# Patient Record
Sex: MALE | Race: BLACK OR AFRICAN AMERICAN | NOT HISPANIC OR LATINO | URBAN - METROPOLITAN AREA
[De-identification: names, ages, dates, MRNs, and addresses within clinical notes are randomized per-mention and may not be internally consistent; named-entity substitution may affect disease eponyms.]

---

## 2020-03-02 ENCOUNTER — INPATIENT (INPATIENT)
Facility: HOSPITAL | Age: 65
LOS: 7 days | Discharge: NURSING, REHAB OR LONG-TERM CARE | End: 2020-03-10
Payer: MEDICARE

## 2020-03-02 VITALS — OXYGEN SATURATION: 100 % | HEART RATE: 54 BPM

## 2020-03-02 DIAGNOSIS — E87.2 ACIDOSIS: ICD-10-CM

## 2020-03-02 LAB
ALBUMIN SERPL ELPH-MCNC: 3.4 G/DL — SIGNIFICANT CHANGE UP (ref 3.3–5)
ALBUMIN SERPL ELPH-MCNC: 3.6 G/DL — SIGNIFICANT CHANGE UP (ref 3.3–5)
ALP SERPL-CCNC: 137 U/L — HIGH (ref 40–120)
ALP SERPL-CCNC: 149 U/L — HIGH (ref 40–120)
ALT FLD-CCNC: 24 U/L — SIGNIFICANT CHANGE UP (ref 4–41)
ALT FLD-CCNC: 24 U/L — SIGNIFICANT CHANGE UP (ref 4–41)
AMMONIA BLD-MCNC: 28 UMOL/L — SIGNIFICANT CHANGE UP (ref 11–55)
ANION GAP SERPL CALC-SCNC: 13 MMO/L — SIGNIFICANT CHANGE UP (ref 7–14)
ANION GAP SERPL CALC-SCNC: 17 MMO/L — HIGH (ref 7–14)
ANION GAP SERPL CALC-SCNC: 22 MMO/L — HIGH (ref 7–14)
APTT BLD: 36.1 SEC — SIGNIFICANT CHANGE UP (ref 27.5–36.3)
AST SERPL-CCNC: 14 U/L — SIGNIFICANT CHANGE UP (ref 4–40)
AST SERPL-CCNC: 14 U/L — SIGNIFICANT CHANGE UP (ref 4–40)
B PERT DNA SPEC QL NAA+PROBE: NOT DETECTED — SIGNIFICANT CHANGE UP
BASE EXCESS BLDA CALC-SCNC: 0.5 MMOL/L — SIGNIFICANT CHANGE UP
BASE EXCESS BLDV CALC-SCNC: 5 MMOL/L — SIGNIFICANT CHANGE UP
BASE EXCESS BLDV CALC-SCNC: 5.7 MMOL/L — SIGNIFICANT CHANGE UP
BASOPHILS # BLD AUTO: 0.01 K/UL — SIGNIFICANT CHANGE UP (ref 0–0.2)
BASOPHILS # BLD AUTO: 0.01 K/UL — SIGNIFICANT CHANGE UP (ref 0–0.2)
BASOPHILS NFR BLD AUTO: 0.1 % — SIGNIFICANT CHANGE UP (ref 0–2)
BASOPHILS NFR BLD AUTO: 0.1 % — SIGNIFICANT CHANGE UP (ref 0–2)
BILIRUB SERPL-MCNC: 0.2 MG/DL — SIGNIFICANT CHANGE UP (ref 0.2–1.2)
BILIRUB SERPL-MCNC: 0.2 MG/DL — SIGNIFICANT CHANGE UP (ref 0.2–1.2)
BLOOD GAS VENOUS - CREATININE: 8.31 MG/DL — HIGH (ref 0.5–1.3)
BLOOD GAS VENOUS - CREATININE: 8.77 MG/DL — HIGH (ref 0.5–1.3)
BLOOD GAS VENOUS - FIO2: 21 — SIGNIFICANT CHANGE UP
BLOOD GAS VENOUS - FIO2: 21 — SIGNIFICANT CHANGE UP
BUN SERPL-MCNC: 46 MG/DL — HIGH (ref 7–23)
BUN SERPL-MCNC: 52 MG/DL — HIGH (ref 7–23)
BUN SERPL-MCNC: 54 MG/DL — HIGH (ref 7–23)
C PNEUM DNA SPEC QL NAA+PROBE: NOT DETECTED — SIGNIFICANT CHANGE UP
CALCIUM SERPL-MCNC: 8.1 MG/DL — LOW (ref 8.4–10.5)
CALCIUM SERPL-MCNC: 8.3 MG/DL — LOW (ref 8.4–10.5)
CALCIUM SERPL-MCNC: 8.5 MG/DL — SIGNIFICANT CHANGE UP (ref 8.4–10.5)
CHLORIDE BLDA-SCNC: 100 MMOL/L — SIGNIFICANT CHANGE UP (ref 96–108)
CHLORIDE BLDV-SCNC: 98 MMOL/L — SIGNIFICANT CHANGE UP (ref 96–108)
CHLORIDE BLDV-SCNC: 99 MMOL/L — SIGNIFICANT CHANGE UP (ref 96–108)
CHLORIDE SERPL-SCNC: 92 MMOL/L — LOW (ref 98–107)
CHLORIDE SERPL-SCNC: 95 MMOL/L — LOW (ref 98–107)
CHLORIDE SERPL-SCNC: 95 MMOL/L — LOW (ref 98–107)
CK MB BLD-MCNC: 2.84 NG/ML — SIGNIFICANT CHANGE UP (ref 1–6.6)
CK SERPL-CCNC: 65 U/L — SIGNIFICANT CHANGE UP (ref 30–200)
CO2 SERPL-SCNC: 18 MMOL/L — LOW (ref 22–31)
CO2 SERPL-SCNC: 23 MMOL/L — SIGNIFICANT CHANGE UP (ref 22–31)
CO2 SERPL-SCNC: 29 MMOL/L — SIGNIFICANT CHANGE UP (ref 22–31)
CREAT SERPL-MCNC: 8.28 MG/DL — HIGH (ref 0.5–1.3)
CREAT SERPL-MCNC: 8.39 MG/DL — HIGH (ref 0.5–1.3)
CREAT SERPL-MCNC: 8.66 MG/DL — HIGH (ref 0.5–1.3)
EOSINOPHIL # BLD AUTO: 0.02 K/UL — SIGNIFICANT CHANGE UP (ref 0–0.5)
EOSINOPHIL # BLD AUTO: 0.19 K/UL — SIGNIFICANT CHANGE UP (ref 0–0.5)
EOSINOPHIL NFR BLD AUTO: 0.2 % — SIGNIFICANT CHANGE UP (ref 0–6)
EOSINOPHIL NFR BLD AUTO: 2.8 % — SIGNIFICANT CHANGE UP (ref 0–6)
FLUAV H1 2009 PAND RNA SPEC QL NAA+PROBE: NOT DETECTED — SIGNIFICANT CHANGE UP
FLUAV H1 RNA SPEC QL NAA+PROBE: NOT DETECTED — SIGNIFICANT CHANGE UP
FLUAV H3 RNA SPEC QL NAA+PROBE: DETECTED — HIGH
FLUBV RNA SPEC QL NAA+PROBE: NOT DETECTED — SIGNIFICANT CHANGE UP
GAS PNL BLDV: 136 MMOL/L — SIGNIFICANT CHANGE UP (ref 136–146)
GAS PNL BLDV: 137 MMOL/L — SIGNIFICANT CHANGE UP (ref 136–146)
GLUCOSE BLDA-MCNC: 135 MG/DL — HIGH (ref 70–99)
GLUCOSE BLDC GLUCOMTR-MCNC: 126 MG/DL — HIGH (ref 70–99)
GLUCOSE BLDV-MCNC: 126 MG/DL — HIGH (ref 70–99)
GLUCOSE BLDV-MCNC: 78 MG/DL — SIGNIFICANT CHANGE UP (ref 70–99)
GLUCOSE SERPL-MCNC: 129 MG/DL — HIGH (ref 70–99)
GLUCOSE SERPL-MCNC: 135 MG/DL — HIGH (ref 70–99)
GLUCOSE SERPL-MCNC: 76 MG/DL — SIGNIFICANT CHANGE UP (ref 70–99)
HADV DNA SPEC QL NAA+PROBE: NOT DETECTED — SIGNIFICANT CHANGE UP
HCO3 BLDA-SCNC: 25 MMOL/L — SIGNIFICANT CHANGE UP (ref 22–26)
HCO3 BLDV-SCNC: 25 MMOL/L — SIGNIFICANT CHANGE UP (ref 20–27)
HCO3 BLDV-SCNC: 26 MMOL/L — SIGNIFICANT CHANGE UP (ref 20–27)
HCOV PNL SPEC NAA+PROBE: SIGNIFICANT CHANGE UP
HCT VFR BLD CALC: 41 % — SIGNIFICANT CHANGE UP (ref 39–50)
HCT VFR BLD CALC: 41.4 % — SIGNIFICANT CHANGE UP (ref 39–50)
HCT VFR BLDA CALC: 38.8 % — LOW (ref 39–51)
HCT VFR BLDV CALC: 40.9 % — SIGNIFICANT CHANGE UP (ref 39–51)
HCT VFR BLDV CALC: 44.4 % — SIGNIFICANT CHANGE UP (ref 39–51)
HGB BLD-MCNC: 12.7 G/DL — LOW (ref 13–17)
HGB BLD-MCNC: 12.7 G/DL — LOW (ref 13–17)
HGB BLDA-MCNC: 12.6 G/DL — LOW (ref 13–17)
HGB BLDV-MCNC: 13.3 G/DL — SIGNIFICANT CHANGE UP (ref 13–17)
HGB BLDV-MCNC: 14.5 G/DL — SIGNIFICANT CHANGE UP (ref 13–17)
HMPV RNA SPEC QL NAA+PROBE: NOT DETECTED — SIGNIFICANT CHANGE UP
HPIV1 RNA SPEC QL NAA+PROBE: NOT DETECTED — SIGNIFICANT CHANGE UP
HPIV2 RNA SPEC QL NAA+PROBE: NOT DETECTED — SIGNIFICANT CHANGE UP
HPIV3 RNA SPEC QL NAA+PROBE: NOT DETECTED — SIGNIFICANT CHANGE UP
HPIV4 RNA SPEC QL NAA+PROBE: NOT DETECTED — SIGNIFICANT CHANGE UP
IMM GRANULOCYTES NFR BLD AUTO: 0.3 % — SIGNIFICANT CHANGE UP (ref 0–1.5)
IMM GRANULOCYTES NFR BLD AUTO: 0.3 % — SIGNIFICANT CHANGE UP (ref 0–1.5)
INR BLD: 1.04 — SIGNIFICANT CHANGE UP (ref 0.88–1.17)
LACTATE BLDA-SCNC: 2.4 MMOL/L — HIGH (ref 0.5–2)
LACTATE BLDV-MCNC: 1.3 MMOL/L — SIGNIFICANT CHANGE UP (ref 0.5–2)
LACTATE BLDV-MCNC: 1.5 MMOL/L — SIGNIFICANT CHANGE UP (ref 0.5–2)
LYMPHOCYTES # BLD AUTO: 0.92 K/UL — LOW (ref 1–3.3)
LYMPHOCYTES # BLD AUTO: 1.5 K/UL — SIGNIFICANT CHANGE UP (ref 1–3.3)
LYMPHOCYTES # BLD AUTO: 10 % — LOW (ref 13–44)
LYMPHOCYTES # BLD AUTO: 22.1 % — SIGNIFICANT CHANGE UP (ref 13–44)
MAGNESIUM SERPL-MCNC: 1.8 MG/DL — SIGNIFICANT CHANGE UP (ref 1.6–2.6)
MCHC RBC-ENTMCNC: 29.6 PG — SIGNIFICANT CHANGE UP (ref 27–34)
MCHC RBC-ENTMCNC: 30.4 PG — SIGNIFICANT CHANGE UP (ref 27–34)
MCHC RBC-ENTMCNC: 30.7 % — LOW (ref 32–36)
MCHC RBC-ENTMCNC: 31 % — LOW (ref 32–36)
MCV RBC AUTO: 95.6 FL — SIGNIFICANT CHANGE UP (ref 80–100)
MCV RBC AUTO: 99 FL — SIGNIFICANT CHANGE UP (ref 80–100)
MONOCYTES # BLD AUTO: 0.27 K/UL — SIGNIFICANT CHANGE UP (ref 0–0.9)
MONOCYTES # BLD AUTO: 0.42 K/UL — SIGNIFICANT CHANGE UP (ref 0–0.9)
MONOCYTES NFR BLD AUTO: 2.9 % — SIGNIFICANT CHANGE UP (ref 2–14)
MONOCYTES NFR BLD AUTO: 6.2 % — SIGNIFICANT CHANGE UP (ref 2–14)
NEUTROPHILS # BLD AUTO: 4.64 K/UL — SIGNIFICANT CHANGE UP (ref 1.8–7.4)
NEUTROPHILS # BLD AUTO: 7.97 K/UL — HIGH (ref 1.8–7.4)
NEUTROPHILS NFR BLD AUTO: 68.5 % — SIGNIFICANT CHANGE UP (ref 43–77)
NEUTROPHILS NFR BLD AUTO: 86.5 % — HIGH (ref 43–77)
NRBC # FLD: 0 K/UL — SIGNIFICANT CHANGE UP (ref 0–0)
NRBC # FLD: 0 K/UL — SIGNIFICANT CHANGE UP (ref 0–0)
PCO2 BLDA: 42 MMHG — SIGNIFICANT CHANGE UP (ref 35–48)
PCO2 BLDV: 65 MMHG — HIGH (ref 41–51)
PCO2 BLDV: 90 MMHG — CRITICAL HIGH (ref 41–51)
PH BLDA: 7.39 PH — SIGNIFICANT CHANGE UP (ref 7.35–7.45)
PH BLDV: 7.19 PH — CRITICAL LOW (ref 7.32–7.43)
PH BLDV: 7.31 PH — LOW (ref 7.32–7.43)
PHOSPHATE SERPL-MCNC: 3.2 MG/DL — SIGNIFICANT CHANGE UP (ref 2.5–4.5)
PLATELET # BLD AUTO: 114 K/UL — LOW (ref 150–400)
PLATELET # BLD AUTO: 114 K/UL — LOW (ref 150–400)
PMV BLD: 10.2 FL — SIGNIFICANT CHANGE UP (ref 7–13)
PMV BLD: 10.8 FL — SIGNIFICANT CHANGE UP (ref 7–13)
PO2 BLDA: 121 MMHG — HIGH (ref 83–108)
PO2 BLDV: 44 MMHG — HIGH (ref 35–40)
PO2 BLDV: < 24 MMHG — LOW (ref 35–40)
POTASSIUM BLDA-SCNC: 6.4 MMOL/L — CRITICAL HIGH (ref 3.4–4.5)
POTASSIUM BLDV-SCNC: 4.5 MMOL/L — SIGNIFICANT CHANGE UP (ref 3.4–4.5)
POTASSIUM BLDV-SCNC: 5.1 MMOL/L — HIGH (ref 3.4–4.5)
POTASSIUM SERPL-MCNC: 4.8 MMOL/L — SIGNIFICANT CHANGE UP (ref 3.5–5.3)
POTASSIUM SERPL-MCNC: 5.4 MMOL/L — HIGH (ref 3.5–5.3)
POTASSIUM SERPL-MCNC: 6.4 MMOL/L — CRITICAL HIGH (ref 3.5–5.3)
POTASSIUM SERPL-SCNC: 4.8 MMOL/L — SIGNIFICANT CHANGE UP (ref 3.5–5.3)
POTASSIUM SERPL-SCNC: 5.4 MMOL/L — HIGH (ref 3.5–5.3)
POTASSIUM SERPL-SCNC: 6.4 MMOL/L — CRITICAL HIGH (ref 3.5–5.3)
PROT SERPL-MCNC: 7 G/DL — SIGNIFICANT CHANGE UP (ref 6–8.3)
PROT SERPL-MCNC: 7.6 G/DL — SIGNIFICANT CHANGE UP (ref 6–8.3)
PROTHROM AB SERPL-ACNC: 11.9 SEC — SIGNIFICANT CHANGE UP (ref 9.8–13.1)
RBC # BLD: 4.18 M/UL — LOW (ref 4.2–5.8)
RBC # BLD: 4.29 M/UL — SIGNIFICANT CHANGE UP (ref 4.2–5.8)
RBC # FLD: 15.5 % — HIGH (ref 10.3–14.5)
RBC # FLD: 15.6 % — HIGH (ref 10.3–14.5)
RSV RNA SPEC QL NAA+PROBE: NOT DETECTED — SIGNIFICANT CHANGE UP
RV+EV RNA SPEC QL NAA+PROBE: NOT DETECTED — SIGNIFICANT CHANGE UP
SAO2 % BLDA: 96.8 % — SIGNIFICANT CHANGE UP (ref 95–99)
SAO2 % BLDV: 28.7 % — LOW (ref 60–85)
SAO2 % BLDV: 65.3 % — SIGNIFICANT CHANGE UP (ref 60–85)
SODIUM BLDA-SCNC: 131 MMOL/L — LOW (ref 136–146)
SODIUM SERPL-SCNC: 132 MMOL/L — LOW (ref 135–145)
SODIUM SERPL-SCNC: 135 MMOL/L — SIGNIFICANT CHANGE UP (ref 135–145)
SODIUM SERPL-SCNC: 137 MMOL/L — SIGNIFICANT CHANGE UP (ref 135–145)
TROPONIN T, HIGH SENSITIVITY: 71 NG/L — CRITICAL HIGH (ref ?–14)
TROPONIN T, HIGH SENSITIVITY: 78 NG/L — CRITICAL HIGH (ref ?–14)
TSH SERPL-MCNC: 1.51 UIU/ML — SIGNIFICANT CHANGE UP (ref 0.27–4.2)
WBC # BLD: 6.78 K/UL — SIGNIFICANT CHANGE UP (ref 3.8–10.5)
WBC # BLD: 9.22 K/UL — SIGNIFICANT CHANGE UP (ref 3.8–10.5)
WBC # FLD AUTO: 6.78 K/UL — SIGNIFICANT CHANGE UP (ref 3.8–10.5)
WBC # FLD AUTO: 9.22 K/UL — SIGNIFICANT CHANGE UP (ref 3.8–10.5)

## 2020-03-02 PROCEDURE — 70450 CT HEAD/BRAIN W/O DYE: CPT | Mod: 26

## 2020-03-02 PROCEDURE — 71045 X-RAY EXAM CHEST 1 VIEW: CPT | Mod: 26,77,76

## 2020-03-02 PROCEDURE — 99291 CRITICAL CARE FIRST HOUR: CPT

## 2020-03-02 PROCEDURE — 71045 X-RAY EXAM CHEST 1 VIEW: CPT | Mod: 26

## 2020-03-02 RX ORDER — DEXTROSE 50 % IN WATER 50 %
25 SYRINGE (ML) INTRAVENOUS ONCE
Refills: 0 | Status: DISCONTINUED | OUTPATIENT
Start: 2020-03-02 | End: 2020-03-03

## 2020-03-02 RX ORDER — CHLORHEXIDINE GLUCONATE 213 G/1000ML
15 SOLUTION TOPICAL EVERY 12 HOURS
Refills: 0 | Status: DISCONTINUED | OUTPATIENT
Start: 2020-03-02 | End: 2020-03-03

## 2020-03-02 RX ORDER — DEXTROSE 50 % IN WATER 50 %
50 SYRINGE (ML) INTRAVENOUS ONCE
Refills: 0 | Status: COMPLETED | OUTPATIENT
Start: 2020-03-02 | End: 2020-03-02

## 2020-03-02 RX ORDER — ROCURONIUM BROMIDE 10 MG/ML
100 VIAL (ML) INTRAVENOUS ONCE
Refills: 0 | Status: COMPLETED | OUTPATIENT
Start: 2020-03-02 | End: 2020-03-02

## 2020-03-02 RX ORDER — NICARDIPINE HYDROCHLORIDE 30 MG/1
5 CAPSULE, EXTENDED RELEASE ORAL
Qty: 40 | Refills: 0 | Status: DISCONTINUED | OUTPATIENT
Start: 2020-03-02 | End: 2020-03-02

## 2020-03-02 RX ORDER — PROPOFOL 10 MG/ML
10 INJECTION, EMULSION INTRAVENOUS
Qty: 500 | Refills: 0 | Status: DISCONTINUED | OUTPATIENT
Start: 2020-03-02 | End: 2020-03-02

## 2020-03-02 RX ORDER — PROPOFOL 10 MG/ML
15 INJECTION, EMULSION INTRAVENOUS
Qty: 1000 | Refills: 0 | Status: DISCONTINUED | OUTPATIENT
Start: 2020-03-02 | End: 2020-03-03

## 2020-03-02 RX ORDER — NALOXONE HYDROCHLORIDE 4 MG/.1ML
2 SPRAY NASAL ONCE
Refills: 0 | Status: COMPLETED | OUTPATIENT
Start: 2020-03-02 | End: 2020-03-02

## 2020-03-02 RX ORDER — SODIUM CHLORIDE 9 MG/ML
1000 INJECTION, SOLUTION INTRAVENOUS
Refills: 0 | Status: DISCONTINUED | OUTPATIENT
Start: 2020-03-02 | End: 2020-03-03

## 2020-03-02 RX ORDER — INSULIN HUMAN 100 [IU]/ML
5 INJECTION, SOLUTION SUBCUTANEOUS ONCE
Refills: 0 | Status: COMPLETED | OUTPATIENT
Start: 2020-03-02 | End: 2020-03-02

## 2020-03-02 RX ORDER — FENTANYL CITRATE 50 UG/ML
50 INJECTION INTRAVENOUS ONCE
Refills: 0 | Status: DISCONTINUED | OUTPATIENT
Start: 2020-03-02 | End: 2020-03-02

## 2020-03-02 RX ORDER — CHLORHEXIDINE GLUCONATE 213 G/1000ML
1 SOLUTION TOPICAL
Refills: 0 | Status: DISCONTINUED | OUTPATIENT
Start: 2020-03-02 | End: 2020-03-10

## 2020-03-02 RX ORDER — INSULIN LISPRO 100/ML
VIAL (ML) SUBCUTANEOUS EVERY 6 HOURS
Refills: 0 | Status: DISCONTINUED | OUTPATIENT
Start: 2020-03-02 | End: 2020-03-05

## 2020-03-02 RX ORDER — GLUCAGON INJECTION, SOLUTION 0.5 MG/.1ML
1 INJECTION, SOLUTION SUBCUTANEOUS ONCE
Refills: 0 | Status: DISCONTINUED | OUTPATIENT
Start: 2020-03-02 | End: 2020-03-03

## 2020-03-02 RX ORDER — FENTANYL CITRATE 50 UG/ML
0.5 INJECTION INTRAVENOUS
Qty: 2500 | Refills: 0 | Status: DISCONTINUED | OUTPATIENT
Start: 2020-03-02 | End: 2020-03-03

## 2020-03-02 RX ORDER — DEXTROSE 50 % IN WATER 50 %
12.5 SYRINGE (ML) INTRAVENOUS ONCE
Refills: 0 | Status: DISCONTINUED | OUTPATIENT
Start: 2020-03-02 | End: 2020-03-03

## 2020-03-02 RX ORDER — ETOMIDATE 2 MG/ML
20 INJECTION INTRAVENOUS ONCE
Refills: 0 | Status: COMPLETED | OUTPATIENT
Start: 2020-03-02 | End: 2020-03-02

## 2020-03-02 RX ORDER — DEXTROSE 50 % IN WATER 50 %
15 SYRINGE (ML) INTRAVENOUS ONCE
Refills: 0 | Status: DISCONTINUED | OUTPATIENT
Start: 2020-03-02 | End: 2020-03-03

## 2020-03-02 RX ADMIN — INSULIN HUMAN 5 UNIT(S): 100 INJECTION, SOLUTION SUBCUTANEOUS at 23:54

## 2020-03-02 RX ADMIN — ETOMIDATE 20 MILLIGRAM(S): 2 INJECTION INTRAVENOUS at 13:55

## 2020-03-02 RX ADMIN — Medication 50 MILLILITER(S): at 23:53

## 2020-03-02 RX ADMIN — CHLORHEXIDINE GLUCONATE 15 MILLILITER(S): 213 SOLUTION TOPICAL at 20:29

## 2020-03-02 RX ADMIN — NALOXONE HYDROCHLORIDE 2 MILLIGRAM(S): 4 SPRAY NASAL at 13:55

## 2020-03-02 RX ADMIN — Medication 30 MILLIGRAM(S): at 23:54

## 2020-03-02 RX ADMIN — FENTANYL CITRATE 50 MICROGRAM(S): 50 INJECTION INTRAVENOUS at 17:00

## 2020-03-02 RX ADMIN — Medication 100 MILLIGRAM(S): at 13:55

## 2020-03-02 NOTE — H&P ADULT - ATTENDING COMMENTS
Pt examined and case reviewed in detail on bedside rounds  Critically ill on vent with unexplained coma temporally associated with minor vascular procedure For head scan  Frequent bedside visits with therapy change today  Crit Care Time Today 35 min+

## 2020-03-02 NOTE — H&P ADULT - NSHPPHYSICALEXAM_GEN_ALL_CORE
GENERAL: NAD, pharmacologically sedated  HEENT: NCAT, pupils 2mm and equal, oral mucosa moist, normal conjunctiva  RESP: faint diffuse expiratory wheeze, no respiratory distress, mechanically ventilated  CV: RRR, no murmurs/rubs/gallops, midline well-healed surgical scar  ABDOMEN: soft, non-tender, non-distended, no guarding, midline well-healed surgical scar below umbilicus   MSK: no visible deformities  NEURO: sedated, RASS -2  SKIN: warm, normal color, well perfused, no rash  PSYCH: sedated

## 2020-03-02 NOTE — ED ADULT NURSE REASSESSMENT NOTE - NS ED NURSE REASSESS COMMENT FT1
Pt received at CT Scan and transported back to Trauma A. VS as documented. No Cardene Gtt at this time. Propofol to stay as per Attending. Pt rectally hypotensive. Warming blanket placed back on. Awaiting bed assignment in MICU. will continue to monitor closely.

## 2020-03-02 NOTE — ED PROVIDER NOTE - CARE PLAN
Principal Discharge DX:	Respiratory acidosis Principal Discharge DX:	Respiratory acidosis  Secondary Diagnosis:	AMS (altered mental status)

## 2020-03-02 NOTE — H&P ADULT - NSHPREVIEWOFSYSTEMS_GEN_ALL_CORE
GENERAL:  EYES:  HEENT:  CARDIAC:  PULMONARY:  GI:   :  SKIN:  NEURO:  MSK:    [ X ] Unable to obtain due to pharmacologic sedation

## 2020-03-02 NOTE — ED PROVIDER NOTE - ATTENDING CONTRIBUTION TO CARE
64 year old male with pmh cabg esrd htn dm presents with ams from outpatient surg center while having fistual eval'd. rec'd 2 mg bzd and 50 mcg fentanyl and became unresponsive.  then rec'd narcan flumazeminal, sol cortef, epi with no relief. nico to ED> with etc02 greater than 75.  attempted to bag and repeat narcan with no improvement.  intubated for airway protection and hypercarbia. keesha consult concern for likley oversedation, hypercarbic resp failure, pna, ich, will check head ct, labs, admit to micu

## 2020-03-02 NOTE — ED PROVIDER NOTE - CLINICAL SUMMARY MEDICAL DECISION MAKING FREE TEXT BOX
63yo M ESRD, HTN DM BIBEMS unresponsive after receiving meds for outside vascular procedure, intubated for airway protection, multiple attempts to ventilate pt with ambu bag, also gave 2mg narcan IV with no response, need labs trop ekg CT head. MICU consulted.

## 2020-03-02 NOTE — ED PROVIDER NOTE - PROGRESS NOTE DETAILS
CTH w/o intracranial bleed. MICU accepting admit.  Morgan Solorzano MD, PGY3 Emergency Medicine Daughter Shireen Lisbeth [phone]

## 2020-03-02 NOTE — ED ADULT TRIAGE NOTE - CHIEF COMPLAINT QUOTE
pt arriving from dental office as notification for respiratory distress. pt received 15mcg of fentanyl. pt taken directly to tra. pt scheduled for LAV fistula procedure for decreased thrill., arriving from Vascular center  as notification for respiratory distress. pt received 50mcg of fentanyl and 2mg of versed. pt taken directly to tra.

## 2020-03-02 NOTE — H&P ADULT - ASSESSMENT
64 yom PMH HTN, DM, ESRD on dialysis, seizure disorder, presents with altered mental status, likely 2/2 hypercarbic respiratory failure.  Patient administered Narcan, Flumazenil, and Dextrose IM by office staff without improvement. Intubated in ED and sent to MICU for further care.  #Neuro: seizure disorder  -Propofol gtt - wean as tolerated  -AEDs - review home medications  #CV: HTN  -Monitor vitals  -IV anti-hypertensives PRN  -Resume home PO meds once extubated  #Resp: intubated  -Wean vent as tolerated  #GI: no active issues  -NPO  #Endocrine: DM  -ISS once extubated  #Renal: ESRD  -Dialysis as necessary  -Monitor potassium on BMP  -F/u EKG  #ID: no active issues  #Heme:   -Heparin SQ  #Dispo:  -Pending 64 yom PMH HTN, DM, ESRD on dialysis, seizure disorder, presents with altered mental status, likely 2/2 hypercarbic respiratory failure.  Patient administered Narcan, Flumazenil, and Dextrose IM by office staff without improvement. Intubated in ED and sent to MICU for further care.  #Neuro: seizure disorder  -Propofol gtt - wean as tolerated  -AEDs - review home medications  #CV: HTN  -Monitor vitals  -IV anti-hypertensives PRN  -Resume home PO meds once extubated  #Resp: intubated  -Wean vent as tolerated  #GI: no active issues  -NPO  #Endocrine: DM  -ISS once extubated  #Renal: ESRD  -Dialysis as necessary (T/Th/Sat)  -Monitor potassium on BMP  -F/u EKG  #ID: no active issues  #Heme:   -Heparin SQ  #Dispo:  -Pending

## 2020-03-02 NOTE — ED PROVIDER NOTE - PMH
Benign essential HTN    CAD (coronary atherosclerotic disease)    Cataract    Chronic kidney disease (CKD)    CVA (cerebrovascular accident)    DM (diabetes mellitus)    ESRD (end stage renal disease) on dialysis    High cholesterol    Seizure disorder    Stented coronary artery

## 2020-03-02 NOTE — ED ADULT NURSE NOTE - CHIEF COMPLAINT QUOTE
pt scheduled for LAV fistula procedure for decreased thrill., arriving from Vascular center  as notification for respiratory distress. pt received 50mcg of fentanyl and 2mg of versed. pt taken directly to tra.

## 2020-03-02 NOTE — ED ADULT NURSE NOTE - OBJECTIVE STATEMENT
64M hx HTN DM ESRD on dialysis seizure disorder brought in from outpatient vascular office, was having a procedure for AV fistula, received 50 mcg fentanyl, 2mg versed, became minimally responsive and bradypneic. Received Narcan, flumazenil solucortef, bicarb, epi, benadryl, and dextrose IM with no response.  Upon arrival patient obtunded in the ED, responsive to pain, intubated to protect airway. NSR, Breath sounds bilaterally with rhonchi. Skin dry and intact.

## 2020-03-02 NOTE — H&P ADULT - HISTORY OF PRESENT ILLNESS
64 yom PMH HTN, DM, ESRD on dialysis, seizure disorder, presents to the ED by ambulance from an outpatient vascular office. Patient was having a procedure performed on his left forearm AV fistula. He received 50 mcg Fentanyl, 2 mg Versed, and subsequently became unresponsive and bradypneic. Patient administered Narcan, Flumazenil, and Dextrose IM by office staff without improvement.  In ED: patient given additional 2 mg Narcan without improvement. Patient subsequently intubated for airway protection and concern of hypercarbic respiratory failure. Vitals notable for temp of 93.4*F. CT head performed showing XXXXX. CXR with XXXXX. Patient transferred to ICU for continued care. 64 yom PMH HTN, DM, ESRD on dialysis, seizure disorder, presents to the ED by ambulance from an outpatient vascular office. Patient was having a procedure performed on his left forearm AV fistula. He received 50 mcg Fentanyl, 2 mg Versed, and subsequently became unresponsive and bradypneic. Patient administered Narcan, Flumazenil, and Dextrose IM by office staff without improvement.  In ED: patient given additional 2 mg Narcan without improvement. Patient subsequently intubated for airway protection and concern of hypercarbic respiratory failure. Vitals notable for temp of 93.4*F. CT head and CXR performed with results pending. Patient transferred to ICU for continued care.

## 2020-03-02 NOTE — H&P ADULT - NSHPLABSRESULTS_GEN_ALL_CORE
12.7   6.78  )-----------( 114      ( 02 Mar 2020 12:50 )             41.4     03-02    137  |  95<L>  |  46<H>  ----------------------------<  76  4.8   |  29  |  8.28<H>    Ca    8.1<L>      02 Mar 2020 12:50    TPro  7.6  /  Alb  3.6  /  TBili  0.2  /  DBili  x   /  AST  14  /  ALT  24  /  AlkPhos  149<H>  03-02    LIVER FUNCTIONS - ( 02 Mar 2020 12:50 )  Alb: 3.6 g/dL / Pro: 7.6 g/dL / ALK PHOS: 149 u/L / ALT: 24 u/L / AST: 14 u/L / GGT: x           PT/INR - ( 02 Mar 2020 12:50 )   PT: 11.9 SEC;   INR: 1.04        PTT - ( 02 Mar 2020 12:50 )  PTT:36.1 SEC    EKG:     RADIOLOGY STUDIES:    < from: Xray Chest 1 View AP/PA (03.02.20 @ 13:38) >    EXAM:  XR CHEST AP OR PA 1V      PROCEDURE DATE:  Mar  2 2020     INTERPRETATION:  CLINICAL INDICATION: intubated    EXAM:  Single frontal chest from 3/2/2020 at 1338. Compared to prior study from 12/20/2012.    IMPRESSION:  Intubated with ETT tip at clavicular level.    Small right pleural reaction. Streaky opacities in adjacent right lower lung could be compatible with subsegmental atelectatic change or scarring. Sharp left CP angle. Grossly clear remaining visualized lungs. No pneumothorax.    Stable cardiomegaly.    Currently present metallic mesh vascular stent in left axillary region.    < end of copied text >    < from: CT Head No Cont (03.02.20 @ 15:26) >    EXAM:  CT BRAIN      PROCEDURE DATE:  Mar  2 2020     INTERPRETATION:  CLINICAL INFORMATION: Altered consciousness.    TECHNIQUE: Noncontrast axial CT images were acquired through the head. Two-dimensional sagittal and coronal reformats were generated.    COMPARISON STUDY: None available.    FINDINGS:     There is no acute intracranial hemorrhage, extra-axial collection, vasogenic edema, mass effect, midline shift, central herniation, or hydrocephalus.     There is moderate cerebral volume loss and patchy white matter hypoattenuation, likely related to chronic microvascular changes. Encephalomalacia and gliosis in the right temporoparietal region likely sequela of old infarct is again noted.    Small air-fluid level seen in the right maxillary sinus. Minimal mucosal thickening is seen involving the right ethmoid sinus. The mastoid air cells and middle ear cavities are clear. Phthisis bulbi of the right globe. Status post left cataract lens surgery.    The soft tissues of the scalpare unremarkable. The calvarium is intact.    IMPRESSION:     No acute intracranial hemorrhage, mass effect or midline shift. No displaced calvarial fracture. Old right posterior temporal infarct.      < end of copied text >

## 2020-03-02 NOTE — ED ADULT NURSE NOTE - NSIMPLEMENTINTERV_GEN_ALL_ED
Implemented All Fall Risk Interventions:  [place] to call system. Call bell, personal items and telephone within reach. Instruct patient to call for assistance. Room bathroom lighting operational. Non-slip footwear when patient is off stretcher. Physically safe environment: no spills, clutter or unnecessary equipment. Stretcher in lowest position, wheels locked, appropriate side rails in place. Provide visual cue, wrist band, yellow gown, etc. Monitor gait and stability. Monitor for mental status changes and reorient to person, place, and time. Review medications for side effects contributing to fall risk. Reinforce activity limits and safety measures with patient and family.

## 2020-03-03 DIAGNOSIS — N18.6 END STAGE RENAL DISEASE: ICD-10-CM

## 2020-03-03 LAB
ALBUMIN SERPL ELPH-MCNC: 3 G/DL — LOW (ref 3.3–5)
ALP SERPL-CCNC: 130 U/L — HIGH (ref 40–120)
ALT FLD-CCNC: 23 U/L — SIGNIFICANT CHANGE UP (ref 4–41)
ANION GAP SERPL CALC-SCNC: 20 MMO/L — HIGH (ref 7–14)
APTT BLD: 32.6 SEC — SIGNIFICANT CHANGE UP (ref 27.5–36.3)
AST SERPL-CCNC: 13 U/L — SIGNIFICANT CHANGE UP (ref 4–40)
BASOPHILS # BLD AUTO: 0.02 K/UL — SIGNIFICANT CHANGE UP (ref 0–0.2)
BASOPHILS NFR BLD AUTO: 0.2 % — SIGNIFICANT CHANGE UP (ref 0–2)
BILIRUB SERPL-MCNC: 0.3 MG/DL — SIGNIFICANT CHANGE UP (ref 0.2–1.2)
BUN SERPL-MCNC: 56 MG/DL — HIGH (ref 7–23)
CALCIUM SERPL-MCNC: 8.4 MG/DL — SIGNIFICANT CHANGE UP (ref 8.4–10.5)
CHLORIDE SERPL-SCNC: 93 MMOL/L — LOW (ref 98–107)
CO2 SERPL-SCNC: 23 MMOL/L — SIGNIFICANT CHANGE UP (ref 22–31)
CREAT SERPL-MCNC: 9.23 MG/DL — HIGH (ref 0.5–1.3)
EOSINOPHIL # BLD AUTO: 0.05 K/UL — SIGNIFICANT CHANGE UP (ref 0–0.5)
EOSINOPHIL NFR BLD AUTO: 0.6 % — SIGNIFICANT CHANGE UP (ref 0–6)
GLUCOSE BLDC GLUCOMTR-MCNC: 110 MG/DL — HIGH (ref 70–99)
GLUCOSE BLDC GLUCOMTR-MCNC: 171 MG/DL — HIGH (ref 70–99)
GLUCOSE BLDC GLUCOMTR-MCNC: 183 MG/DL — HIGH (ref 70–99)
GLUCOSE BLDC GLUCOMTR-MCNC: 225 MG/DL — HIGH (ref 70–99)
GLUCOSE BLDC GLUCOMTR-MCNC: 52 MG/DL — LOW (ref 70–99)
GLUCOSE BLDC GLUCOMTR-MCNC: 71 MG/DL — SIGNIFICANT CHANGE UP (ref 70–99)
GLUCOSE BLDC GLUCOMTR-MCNC: 84 MG/DL — SIGNIFICANT CHANGE UP (ref 70–99)
GLUCOSE BLDC GLUCOMTR-MCNC: 99 MG/DL — SIGNIFICANT CHANGE UP (ref 70–99)
GLUCOSE SERPL-MCNC: 80 MG/DL — SIGNIFICANT CHANGE UP (ref 70–99)
HBA1C BLD-MCNC: 7.5 % — HIGH (ref 4–5.6)
HBV SURFACE AG SER-ACNC: NEGATIVE — SIGNIFICANT CHANGE UP
HCT VFR BLD CALC: 36.7 % — LOW (ref 39–50)
HCV AB S/CO SERPL IA: 0.15 S/CO — SIGNIFICANT CHANGE UP (ref 0–0.99)
HCV AB SERPL-IMP: SIGNIFICANT CHANGE UP
HGB BLD-MCNC: 12 G/DL — LOW (ref 13–17)
IMM GRANULOCYTES NFR BLD AUTO: 0.2 % — SIGNIFICANT CHANGE UP (ref 0–1.5)
LYMPHOCYTES # BLD AUTO: 1.58 K/UL — SIGNIFICANT CHANGE UP (ref 1–3.3)
LYMPHOCYTES # BLD AUTO: 19.3 % — SIGNIFICANT CHANGE UP (ref 13–44)
MAGNESIUM SERPL-MCNC: 1.7 MG/DL — SIGNIFICANT CHANGE UP (ref 1.6–2.6)
MCHC RBC-ENTMCNC: 30.3 PG — SIGNIFICANT CHANGE UP (ref 27–34)
MCHC RBC-ENTMCNC: 32.7 % — SIGNIFICANT CHANGE UP (ref 32–36)
MCV RBC AUTO: 92.7 FL — SIGNIFICANT CHANGE UP (ref 80–100)
MONOCYTES # BLD AUTO: 0.54 K/UL — SIGNIFICANT CHANGE UP (ref 0–0.9)
MONOCYTES NFR BLD AUTO: 6.6 % — SIGNIFICANT CHANGE UP (ref 2–14)
NEUTROPHILS # BLD AUTO: 5.96 K/UL — SIGNIFICANT CHANGE UP (ref 1.8–7.4)
NEUTROPHILS NFR BLD AUTO: 73.1 % — SIGNIFICANT CHANGE UP (ref 43–77)
NRBC # FLD: 0 K/UL — SIGNIFICANT CHANGE UP (ref 0–0)
PHOSPHATE SERPL-MCNC: 2 MG/DL — LOW (ref 2.5–4.5)
PLATELET # BLD AUTO: 109 K/UL — LOW (ref 150–400)
PMV BLD: 10.9 FL — SIGNIFICANT CHANGE UP (ref 7–13)
POTASSIUM SERPL-MCNC: 4.8 MMOL/L — SIGNIFICANT CHANGE UP (ref 3.5–5.3)
POTASSIUM SERPL-SCNC: 4.8 MMOL/L — SIGNIFICANT CHANGE UP (ref 3.5–5.3)
PROT SERPL-MCNC: 7 G/DL — SIGNIFICANT CHANGE UP (ref 6–8.3)
RBC # BLD: 3.96 M/UL — LOW (ref 4.2–5.8)
RBC # FLD: 15.4 % — HIGH (ref 10.3–14.5)
SODIUM SERPL-SCNC: 136 MMOL/L — SIGNIFICANT CHANGE UP (ref 135–145)
SPECIMEN SOURCE: SIGNIFICANT CHANGE UP
WBC # BLD: 8.17 K/UL — SIGNIFICANT CHANGE UP (ref 3.8–10.5)
WBC # FLD AUTO: 8.17 K/UL — SIGNIFICANT CHANGE UP (ref 3.8–10.5)

## 2020-03-03 PROCEDURE — 99291 CRITICAL CARE FIRST HOUR: CPT

## 2020-03-03 RX ORDER — ASPIRIN/CALCIUM CARB/MAGNESIUM 324 MG
81 TABLET ORAL DAILY
Refills: 0 | Status: DISCONTINUED | OUTPATIENT
Start: 2020-03-03 | End: 2020-03-04

## 2020-03-03 RX ORDER — CLOPIDOGREL BISULFATE 75 MG/1
75 TABLET, FILM COATED ORAL DAILY
Refills: 0 | Status: DISCONTINUED | OUTPATIENT
Start: 2020-03-03 | End: 2020-03-04

## 2020-03-03 RX ORDER — HEPARIN SODIUM 5000 [USP'U]/ML
5000 INJECTION INTRAVENOUS; SUBCUTANEOUS EVERY 8 HOURS
Refills: 0 | Status: DISCONTINUED | OUTPATIENT
Start: 2020-03-03 | End: 2020-03-10

## 2020-03-03 RX ORDER — SODIUM CHLORIDE 9 MG/ML
1000 INJECTION, SOLUTION INTRAVENOUS
Refills: 0 | Status: DISCONTINUED | OUTPATIENT
Start: 2020-03-03 | End: 2020-03-05

## 2020-03-03 RX ORDER — NOREPINEPHRINE BITARTRATE/D5W 8 MG/250ML
0.05 PLASTIC BAG, INJECTION (ML) INTRAVENOUS
Qty: 8 | Refills: 0 | Status: DISCONTINUED | OUTPATIENT
Start: 2020-03-03 | End: 2020-03-04

## 2020-03-03 RX ORDER — DEXTROSE 50 % IN WATER 50 %
25 SYRINGE (ML) INTRAVENOUS ONCE
Refills: 0 | Status: COMPLETED | OUTPATIENT
Start: 2020-03-03 | End: 2020-03-03

## 2020-03-03 RX ORDER — ACETAMINOPHEN 500 MG
650 TABLET ORAL ONCE
Refills: 0 | Status: COMPLETED | OUTPATIENT
Start: 2020-03-03 | End: 2020-03-03

## 2020-03-03 RX ORDER — PREGABALIN 225 MG/1
1000 CAPSULE ORAL DAILY
Refills: 0 | Status: DISCONTINUED | OUTPATIENT
Start: 2020-03-03 | End: 2020-03-04

## 2020-03-03 RX ORDER — NOREPINEPHRINE BITARTRATE/D5W 8 MG/250ML
0.05 PLASTIC BAG, INJECTION (ML) INTRAVENOUS
Qty: 8 | Refills: 0 | Status: DISCONTINUED | OUTPATIENT
Start: 2020-03-03 | End: 2020-03-03

## 2020-03-03 RX ORDER — PANTOPRAZOLE SODIUM 20 MG/1
40 TABLET, DELAYED RELEASE ORAL DAILY
Refills: 0 | Status: DISCONTINUED | OUTPATIENT
Start: 2020-03-03 | End: 2020-03-06

## 2020-03-03 RX ORDER — HEPARIN SODIUM 5000 [USP'U]/ML
5000 INJECTION INTRAVENOUS; SUBCUTANEOUS EVERY 12 HOURS
Refills: 0 | Status: DISCONTINUED | OUTPATIENT
Start: 2020-03-03 | End: 2020-03-03

## 2020-03-03 RX ORDER — FOLIC ACID 0.8 MG
1 TABLET ORAL DAILY
Refills: 0 | Status: DISCONTINUED | OUTPATIENT
Start: 2020-03-03 | End: 2020-03-04

## 2020-03-03 RX ORDER — DEXMEDETOMIDINE HYDROCHLORIDE IN 0.9% SODIUM CHLORIDE 4 UG/ML
0.5 INJECTION INTRAVENOUS
Qty: 200 | Refills: 0 | Status: DISCONTINUED | OUTPATIENT
Start: 2020-03-03 | End: 2020-03-03

## 2020-03-03 RX ORDER — FENTANYL CITRATE 50 UG/ML
50 INJECTION INTRAVENOUS ONCE
Refills: 0 | Status: DISCONTINUED | OUTPATIENT
Start: 2020-03-03 | End: 2020-03-03

## 2020-03-03 RX ADMIN — Medication 9.88 MICROGRAM(S)/KG/MIN: at 19:00

## 2020-03-03 RX ADMIN — PROPOFOL 9.49 MICROGRAM(S)/KG/MIN: 10 INJECTION, EMULSION INTRAVENOUS at 08:46

## 2020-03-03 RX ADMIN — SODIUM CHLORIDE 50 MILLILITER(S): 9 INJECTION, SOLUTION INTRAVENOUS at 19:00

## 2020-03-03 RX ADMIN — CHLORHEXIDINE GLUCONATE 15 MILLILITER(S): 213 SOLUTION TOPICAL at 17:37

## 2020-03-03 RX ADMIN — FENTANYL CITRATE 5.27 MICROGRAM(S)/KG/HR: 50 INJECTION INTRAVENOUS at 08:46

## 2020-03-03 RX ADMIN — Medication 650 MILLIGRAM(S): at 08:46

## 2020-03-03 RX ADMIN — CHLORHEXIDINE GLUCONATE 1 APPLICATION(S): 213 SOLUTION TOPICAL at 06:02

## 2020-03-03 RX ADMIN — CHLORHEXIDINE GLUCONATE 15 MILLILITER(S): 213 SOLUTION TOPICAL at 07:01

## 2020-03-03 RX ADMIN — HEPARIN SODIUM 5000 UNIT(S): 5000 INJECTION INTRAVENOUS; SUBCUTANEOUS at 22:06

## 2020-03-03 RX ADMIN — Medication 650 MILLIGRAM(S): at 10:00

## 2020-03-03 RX ADMIN — HEPARIN SODIUM 5000 UNIT(S): 5000 INJECTION INTRAVENOUS; SUBCUTANEOUS at 14:44

## 2020-03-03 RX ADMIN — Medication 30 MILLIGRAM(S): at 12:07

## 2020-03-03 RX ADMIN — SODIUM CHLORIDE 50 MILLILITER(S): 9 INJECTION, SOLUTION INTRAVENOUS at 12:06

## 2020-03-03 RX ADMIN — Medication 25 GRAM(S): at 11:40

## 2020-03-03 RX ADMIN — FENTANYL CITRATE 50 MICROGRAM(S): 50 INJECTION INTRAVENOUS at 18:01

## 2020-03-03 RX ADMIN — PANTOPRAZOLE SODIUM 40 MILLIGRAM(S): 20 TABLET, DELAYED RELEASE ORAL at 12:07

## 2020-03-03 NOTE — CONSULT NOTE ADULT - PROBLEM SELECTOR RECOMMENDATION 9
Maintenance HD schedule TTS.  Notable thrill on AV access, will schedule for HD treatment today.  Low BP noted, low UF goals planned.  HD consent obtained from pts nephew Bjorn Moore.  Electrolytes within normal limits.  Vent and pressor management as per ICU team.

## 2020-03-03 NOTE — CONSULT NOTE ADULT - SUBJECTIVE AND OBJECTIVE BOX
[facility] Consult Note Nephrology - CONSULTATION NOTE - [phone] - Dr Crockett / Dr Pa / Dr Garcia / Dr Sapp / Dr Hernadez / Dr Howell / Dr Harper / Dr Carey    Patient is a 64y Male whom presented to the hospital with     PAST MEDICAL & SURGICAL HISTORY:  Cataract  Stented coronary artery  ESRD (end stage renal disease) on dialysis  Seizure disorder  Chronic kidney disease (CKD)  CAD (coronary atherosclerotic disease)  CVA (cerebrovascular accident)  High cholesterol  Benign essential HTN  DM (diabetes mellitus)  AVF (arteriovenous fistula): left arm  S/P primary angioplasty with coronary stent  S/P CABG    Allergies:  No Known Allergies    Home Medications Reviewed  Hospital Medications:   MEDICATIONS  (STANDING):  chlorhexidine 0.12% Liquid 15 milliLiter(s) Oral Mucosa every 12 hours  chlorhexidine 4% Liquid 1 Application(s) Topical <User Schedule>  dexMEDEtomidine Infusion 0.5 MICROgram(s)/kG/Hr (13.175 mL/Hr) IV Continuous <Continuous>  dextrose 5% + lactated ringers. 1000 milliLiter(s) (50 mL/Hr) IV Continuous <Continuous>  heparin  Injectable 5000 Unit(s) SubCutaneous every 8 hours  insulin lispro (HumaLOG) corrective regimen sliding scale   SubCutaneous every 6 hours  norepinephrine Infusion 0.05 MICROgram(s)/kG/Min (9.881 mL/Hr) IV Continuous <Continuous>  oseltamivir Suspension 30 milliGRAM(s) Oral daily  pantoprazole  Injectable 40 milliGRAM(s) IV Push daily    SOCIAL HISTORY:  Denies ETOh,Smoking,   FAMILY HISTORY:    REVIEW OF SYSTEMS:  CONSTITUTIONAL: No weakness, fevers or chills  EYES/ENT: No visual changes;  No vertigo or throat pain   NECK: No pain or stiffness  RESPIRATORY: No cough, wheezing, hemoptysis; No shortness of breath  CARDIOVASCULAR: No chest pain or palpitations.  GASTROINTESTINAL: No abdominal or epigastric pain. No nausea, vomiting, or hematemesis; No diarrhea or constipation. No melena or hematochezia.  GENITOURINARY: No dysuria, frequency, foamy urine, urinary urgency, incontinence or hematuria  NEUROLOGICAL: No numbness or weakness  SKIN: No itching, burning, rashes, or lesions   VASCULAR: No bilateral lower extremity edema.   All other review of systems is negative unless indicated above.    VITALS:  T(F): 99.4 (03-03-20 @ 12:00), Max: 100.8 (03-03-20 @ 08:00)  HR: 66 (03-03-20 @ 12:10)  BP: 131/45 (03-03-20 @ 12:10)  RR: 14 (03-03-20 @ 12:10)  SpO2: 100% (03-03-20 @ 12:10)  Wt(kg): --    03-02 @ 07:01  -  03-03 @ 07:00  --------------------------------------------------------  IN: 426.3 mL / OUT: 0 mL / NET: 426.3 mL    03-03 @ 07:01  -  03-03 @ 12:33  --------------------------------------------------------  IN: 128.5 mL / OUT: 0 mL / NET: 128.5 mL      Height (cm): 180.3 (03-02 @ 18:04)  Weight (kg): 105.4 (03-02 @ 18:04)  BMI (kg/m2): 32.4 (03-02 @ 18:04)  BSA (m2): 2.25 (03-02 @ 18:04)  PHYSICAL EXAM:  Constitutional: NAD  HEENT: ETT  Neck: No JVD  Respiratory: CTAB, no wheezes, rales or rhonchi  Cardiovascular: S1, S2, RRR  Gastrointestinal: BS+, soft, NT/ND  Extremities: No cyanosis or clubbing. No peripheral edema  Neurological: sedated  : No CVA tenderness. No de luna.   Skin: No rashes  Vascular Access: LUE AV access +thrill and bruit.     LABS:  03-03    136  |  93<L>  |  56<H>  ----------------------------<  80  4.8   |  23  |  9.23<H>    Ca    8.4      03 Mar 2020 05:18  Phos  2.0     03-03  Mg     1.7     03-03    TPro  7.0  /  Alb  3.0<L>  /  TBili  0.3  /  DBili      /  AST  13  /  ALT  23  /  AlkPhos  130<H>  03-03    Creatinine Trend: 9.23 <--, 8.66 <--, 8.39 <--, 8.28 <--                        12.0   8.17  )-----------( 109      ( 03 Mar 2020 05:18 )             36.7     Urine Studies:      RADIOLOGY & ADDITIONAL STUDIES: [facility] Consult Note Nephrology - CONSULTATION NOTE - [phone] - Dr Crockett / Dr Pa / Dr Garcia / Dr Sapp / Dr Hernadez / Dr Howell / Dr Harper / Dr Carey    Patient is a 64y Male NH resident with HTN, DM, CAD s/p CABG ESRD on dialysis TTS, seizure disorder, BIBEMS from outpatient vascular office. Patient was having angioplasty of on his left forearm AV fistula. He received 50 mcg Fentanyl, 2 mg Versed, and subsequently became unresponsive and bradypneic. Patient administered Narcan, Flumazenil, and Dextrose IM by office staff without improvement. In ED: patient given additional 2 mg Narcan without improvement. Patient subsequently intubated for airway protection and concern of hypercarbic respiratory failure. Vitals notable for temp of 93.4*F. Pt remains intubated at this time. On pressor support. Sedated.     PAST MEDICAL & SURGICAL HISTORY:  Cataract  Stented coronary artery  ESRD (end stage renal disease) on dialysis  Seizure disorder  Chronic kidney disease (CKD)  CAD (coronary atherosclerotic disease)  CVA (cerebrovascular accident)  High cholesterol  Benign essential HTN  DM (diabetes mellitus)  AVF (arteriovenous fistula): left arm  S/P primary angioplasty with coronary stent  S/P CABG    Allergies:  No Known Allergies    Home Medications Reviewed  Hospital Medications:   MEDICATIONS  (STANDING):  chlorhexidine 0.12% Liquid 15 milliLiter(s) Oral Mucosa every 12 hours  chlorhexidine 4% Liquid 1 Application(s) Topical <User Schedule>  dexMEDEtomidine Infusion 0.5 MICROgram(s)/kG/Hr (13.175 mL/Hr) IV Continuous <Continuous>  dextrose 5% + lactated ringers. 1000 milliLiter(s) (50 mL/Hr) IV Continuous <Continuous>  heparin  Injectable 5000 Unit(s) SubCutaneous every 8 hours  insulin lispro (HumaLOG) corrective regimen sliding scale   SubCutaneous every 6 hours  norepinephrine Infusion 0.05 MICROgram(s)/kG/Min (9.881 mL/Hr) IV Continuous <Continuous>  oseltamivir Suspension 30 milliGRAM(s) Oral daily  pantoprazole  Injectable 40 milliGRAM(s) IV Push daily    SOCIAL HISTORY:  Denies ETOh,Smoking,   FAMILY HISTORY:    REVIEW OF SYSTEMS:  unable to obtain due to sedation     VITALS:  T(F): 99.4 (03-03-20 @ 12:00), Max: 100.8 (03-03-20 @ 08:00)  HR: 66 (03-03-20 @ 12:10)  BP: 131/45 (03-03-20 @ 12:10)  RR: 14 (03-03-20 @ 12:10)  SpO2: 100% (03-03-20 @ 12:10)  Wt(kg): --    03-02 @ 07:01  -  03-03 @ 07:00  --------------------------------------------------------  IN: 426.3 mL / OUT: 0 mL / NET: 426.3 mL    03-03 @ 07:01  -  03-03 @ 12:33  --------------------------------------------------------  IN: 128.5 mL / OUT: 0 mL / NET: 128.5 mL      Height (cm): 180.3 (03-02 @ 18:04)  Weight (kg): 105.4 (03-02 @ 18:04)  BMI (kg/m2): 32.4 (03-02 @ 18:04)  BSA (m2): 2.25 (03-02 @ 18:04)  PHYSICAL EXAM:  Constitutional: NAD  HEENT: ETT  Neck: No JVD  Respiratory: CTAB, no wheezes, rales or rhonchi  Cardiovascular: S1, S2, RRR  Gastrointestinal: BS+, soft, NT/ND  Extremities: No cyanosis or clubbing. No peripheral edema  Neurological: sedated  : No CVA tenderness. No de luna.   Skin: No rashes  Vascular Access: LUE AV access +thrill and bruit.     LABS:  03-03    136  |  93<L>  |  56<H>  ----------------------------<  80  4.8   |  23  |  9.23<H>    Ca    8.4      03 Mar 2020 05:18  Phos  2.0     03-03  Mg     1.7     03-03    TPro  7.0  /  Alb  3.0<L>  /  TBili  0.3  /  DBili      /  AST  13  /  ALT  23  /  AlkPhos  130<H>  03-03    Creatinine Trend: 9.23 <--, 8.66 <--, 8.39 <--, 8.28 <--                        12.0   8.17  )-----------( 109      ( 03 Mar 2020 05:18 )             36.7     Urine Studies:      RADIOLOGY & ADDITIONAL STUDIES:  < from: Xray Chest 1 View- PORTABLE-Urgent (03.02.20 @ 22:56) >  IMPRESSION:    1. Endotracheal and enteric tubes in position on most recent exam.  2. Stable cardiomegaly with loculated right effusion.    < end of copied text >    < from: CT Head No Cont (03.02.20 @ 15:26) >    IMPRESSION:     No acute intracranial hemorrhage, mass effect or midline shift. No displaced calvarial fracture. Old right posterior temporal infarct.    < end of copied text >

## 2020-03-03 NOTE — PROVIDER CONTACT NOTE (OTHER) - RECOMMENDATIONS
Team made aware 25g of dextrose to be administered as ordered. Will recheck FS as per hypoglycemia protocol.

## 2020-03-03 NOTE — CONSULT NOTE ADULT - ASSESSMENT
64y Male NH resident with HTN, DM, CAD s/p CABG ESRD on dialysis TTS, seizure disorder, BIBEMS from outpatient vascular office, for unresponsiveness after angioplasty.

## 2020-03-03 NOTE — CHART NOTE - NSCHARTNOTEFT_GEN_A_CORE
: hSaye Newman MD    INDICATION:    PROCEDURE:  [x] LIMITED ECHO  [x] LIMITED CHEST  [ ] LIMITED RETROPERITONEAL  [ ] LIMITED ABDOMINAL  [ ] LIMITED DVT  [ ] NEEDLE GUIDANCE VASCULAR  [ ] NEEDLE GUIDANCE THORACENTESIS  [ ] NEEDLE GUIDANCE PARACENTESIS  [ ] NEEDLE GUIDANCE PERICARDIOCENTESIS  [ ] OTHER    FINDINGS:  LUNGS: A-line predominant b/l  ECHO: poor cardiac windows    INTERPRETATION:  Normal aeration of lungs  Difficult to visualize cardiac structures due to poor windows.     Images stored on [facility].    Shaye Newman MD  PGY-1  Internal Medicine Prelim : Shaye Newman MD with Rodo HOOD at bedside    INDICATION:    PROCEDURE:  [x] LIMITED ECHO  [x] LIMITED CHEST  [ ] LIMITED RETROPERITONEAL  [ ] LIMITED ABDOMINAL  [ ] LIMITED DVT  [ ] NEEDLE GUIDANCE VASCULAR  [ ] NEEDLE GUIDANCE THORACENTESIS  [ ] NEEDLE GUIDANCE PARACENTESIS  [ ] NEEDLE GUIDANCE PERICARDIOCENTESIS  [ ] OTHER    FINDINGS:  LUNGS: A-line predominant b/l  ECHO: poor cardiac windows    INTERPRETATION:  Normal aeration of lungs  Difficult to visualize cardiac structures due to poor windows.     Images stored on [employer].    Shaye Newman MD  PGY-1  Internal Medicine Prelim

## 2020-03-03 NOTE — CONSULT NOTE ADULT - ATTENDING COMMENTS
Dr Odilon Crockett  Nephrology  Office [phone]  Ans Serv [phone]  [facility], [place] [phone]

## 2020-03-03 NOTE — PROGRESS NOTE ADULT - ASSESSMENT
64 yom PMH HTN, DM, ESRD on dialysis, seizure disorder, presents with altered mental status, likely 2/2 hypercarbic respiratory failure.  Patient administered Narcan, Flumazenil, and Dextrose IM by office staff without improvement. Intubated in ED and sent to MICU for further care.  #Neuro  - intubated on sedation, plan to wean down.   #CV: HTN  -Monitor vitals  -plan to renew home PO medications when patient is off pressors (coreg 25 mg BID- pt still is on levophed now)  #Resp: wean down FiO2 and sedation in effort to extubate.     #GI: no active issues  -plan to renew diet once extubated  #Endocrine: DM  -ISS once extubated  #Renal: ESRD  -Dialysis as necessary (T/Th/Sat), HD planned for today  -Monitor potassium on BMP  #ID: no active issues  #Heme:   -Heparin SQ  #Dispo:  -Pending

## 2020-03-03 NOTE — PROGRESS NOTE ADULT - SUBJECTIVE AND OBJECTIVE BOX
CHIEF COMPLAINT:    Interval Events: Pt remains intubated on sedation. OGT placed but tube feeds not started at this time.     REVIEW OF SYSTEMS:  Constitutional: [ ] negative [ ] fevers [ ] chills [ ] weight loss [ ] weight gain  HEENT: [ ] negative [ ] dry eyes [ ] eye irritation [ ] postnasal drip [ ] nasal congestion  CV: [ ] negative  [ ] chest pain [ ] orthopnea [ ] palpitations [ ] murmur  Resp: [ ] negative [ ] cough [ ] shortness of breath [ ] dyspnea [ ] wheezing [ ] sputum [ ] hemoptysis  GI: [ ] negative [ ] nausea [ ] vomiting [ ] diarrhea [ ] constipation [ ] abd pain [ ] dysphagia   : [ ] negative [ ] dysuria [ ] nocturia [ ] hematuria [ ] increased urinary frequency  Musculoskeletal: [ ] negative [ ] back pain [ ] myalgias [ ] arthralgias [ ] fracture  Skin: [ ] negative [ ] rash [ ] itch  Neurological: [ ] negative [ ] headache [ ] dizziness [ ] syncope [ ] weakness [ ] numbness  Psychiatric: [ ] negative [ ] anxiety [ ] depression  Endocrine: [ ] negative [ ] diabetes [ ] thyroid problem  Hematologic/Lymphatic: [ ] negative [ ] anemia [ ] bleeding problem  Allergic/Immunologic: [ ] negative [ ] itchy eyes [ ] nasal discharge [ ] hives [ ] angioedema  [ ] All other systems negative  [ ] Unable to assess ROS because ________    OBJECTIVE:  ICU Vital Signs Last 24 Hrs  T(C): 38.2 (03 Mar 2020 08:00), Max: 38.2 (03 Mar 2020 08:00)  T(F): 100.8 (03 Mar 2020 08:00), Max: 100.8 (03 Mar 2020 08:00)  HR: 63 (03 Mar 2020 08:00) (52 - 67)  BP: 108/40 (03 Mar 2020 08:00) (62/42 - 199/74)  BP(mean): 60 (03 Mar 2020 08:00) (42 - 97)  ABP: --  ABP(mean): --  RR: 14 (03 Mar 2020 08:00) (14 - 28)  SpO2: 100% (03 Mar 2020 08:00) (96% - 100%)    Mode: AC/ CMV (Assist Control/ Continuous Mandatory Ventilation), RR (machine): 14, TV (machine): 500, FiO2: 50, PEEP: 5, PS: 5, ITime: 1, MAP: 10, PIP: 28    03-02 @ 07:01  -  03-03 @ 07:00  --------------------------------------------------------  IN: 426.3 mL / OUT: 0 mL / NET: 426.3 mL      CAPILLARY BLOOD GLUCOSE      POCT Blood Glucose.: 84 mg/dL (03 Mar 2020 05:26)      PHYSICAL EXAM:  General:   HEENT:   Lymph Nodes:  Neck:   Respiratory:   Cardiovascular:   Abdomen:   Extremities:   Skin:   Neurological:  Psychiatry:    LINES:    HOSPITAL MEDICATIONS:    oseltamivir Suspension 30 milliGRAM(s) Oral daily    norepinephrine Infusion 0.05 MICROgram(s)/kG/Min IV Continuous <Continuous>    insulin lispro (HumaLOG) corrective regimen sliding scale   SubCutaneous every 6 hours      acetaminophen   Tablet .. 650 milliGRAM(s) Oral once  fentaNYL   Infusion. 0.5 MICROgram(s)/kG/Hr IV Continuous <Continuous>  propofol Infusion 15 MICROgram(s)/kG/Min IV Continuous <Continuous>              chlorhexidine 0.12% Liquid 15 milliLiter(s) Oral Mucosa every 12 hours  chlorhexidine 4% Liquid 1 Application(s) Topical <User Schedule>        LABS:                        12.0   8.17  )-----------( 109      ( 03 Mar 2020 05:18 )             36.7     Hgb Trend: 12.0<--, 12.7<--, 12.7<--  03-03    136  |  93<L>  |  56<H>  ----------------------------<  80  4.8   |  23  |  9.23<H>    Ca    8.4      03 Mar 2020 05:18  Phos  2.0     03-03  Mg     1.7     03-03    TPro  7.0  /  Alb  3.0<L>  /  TBili  0.3  /  DBili  x   /  AST  13  /  ALT  23  /  AlkPhos  130<H>  03-03    Creatinine Trend: 9.23<--, 8.66<--, 8.39<--, 8.28<--  PT/INR - ( 02 Mar 2020 12:50 )   PT: 11.9 SEC;   INR: 1.04          PTT - ( 03 Mar 2020 05:18 )  PTT:32.6 SEC    Arterial Blood Gas:  03-02 @ 19:00  7.39/42/121/25/96.8/0.5  ABG lactate: 2.4    Venous Blood Gas:  03-02 @ 15:49  7.31/65/< 24/26/28.7  VBG Lactate: 1.5  Venous Blood Gas:  03-02 @ 12:50  7.19/90/44/25/65.3  VBG Lactate: 1.3      MICROBIOLOGY:     RADIOLOGY:  [ ] Reviewed and interpreted by me    EKG:

## 2020-03-04 LAB
ALBUMIN SERPL ELPH-MCNC: 3.5 G/DL — SIGNIFICANT CHANGE UP (ref 3.3–5)
ALP SERPL-CCNC: 144 U/L — HIGH (ref 40–120)
ALT FLD-CCNC: 19 U/L — SIGNIFICANT CHANGE UP (ref 4–41)
ANION GAP SERPL CALC-SCNC: 17 MMO/L — HIGH (ref 7–14)
AST SERPL-CCNC: 19 U/L — SIGNIFICANT CHANGE UP (ref 4–40)
BASOPHILS # BLD AUTO: 0.04 K/UL — SIGNIFICANT CHANGE UP (ref 0–0.2)
BASOPHILS NFR BLD AUTO: 0.2 % — SIGNIFICANT CHANGE UP (ref 0–2)
BILIRUB SERPL-MCNC: 0.4 MG/DL — SIGNIFICANT CHANGE UP (ref 0.2–1.2)
BUN SERPL-MCNC: 34 MG/DL — HIGH (ref 7–23)
CALCIUM SERPL-MCNC: 8.7 MG/DL — SIGNIFICANT CHANGE UP (ref 8.4–10.5)
CHLORIDE SERPL-SCNC: 91 MMOL/L — LOW (ref 98–107)
CO2 SERPL-SCNC: 24 MMOL/L — SIGNIFICANT CHANGE UP (ref 22–31)
CREAT SERPL-MCNC: 7.37 MG/DL — HIGH (ref 0.5–1.3)
EOSINOPHIL # BLD AUTO: 0.16 K/UL — SIGNIFICANT CHANGE UP (ref 0–0.5)
EOSINOPHIL NFR BLD AUTO: 0.8 % — SIGNIFICANT CHANGE UP (ref 0–6)
GLUCOSE BLDC GLUCOMTR-MCNC: 143 MG/DL — HIGH (ref 70–99)
GLUCOSE BLDC GLUCOMTR-MCNC: 152 MG/DL — HIGH (ref 70–99)
GLUCOSE BLDC GLUCOMTR-MCNC: 159 MG/DL — HIGH (ref 70–99)
GLUCOSE BLDC GLUCOMTR-MCNC: 190 MG/DL — HIGH (ref 70–99)
GLUCOSE SERPL-MCNC: 201 MG/DL — HIGH (ref 70–99)
GRAM STN FLD: SIGNIFICANT CHANGE UP
HBV CORE AB SER-ACNC: REACTIVE — SIGNIFICANT CHANGE UP
HBV SURFACE AB SER-ACNC: 176.4 MLU/ML — SIGNIFICANT CHANGE UP
HCT VFR BLD CALC: 41.7 % — SIGNIFICANT CHANGE UP (ref 39–50)
HGB BLD-MCNC: 13.2 G/DL — SIGNIFICANT CHANGE UP (ref 13–17)
IMM GRANULOCYTES NFR BLD AUTO: 0.6 % — SIGNIFICANT CHANGE UP (ref 0–1.5)
LYMPHOCYTES # BLD AUTO: 1.3 K/UL — SIGNIFICANT CHANGE UP (ref 1–3.3)
LYMPHOCYTES # BLD AUTO: 6.9 % — LOW (ref 13–44)
MAGNESIUM SERPL-MCNC: 1.8 MG/DL — SIGNIFICANT CHANGE UP (ref 1.6–2.6)
MCHC RBC-ENTMCNC: 30.5 PG — SIGNIFICANT CHANGE UP (ref 27–34)
MCHC RBC-ENTMCNC: 31.7 % — LOW (ref 32–36)
MCV RBC AUTO: 96.3 FL — SIGNIFICANT CHANGE UP (ref 80–100)
MONOCYTES # BLD AUTO: 1.06 K/UL — HIGH (ref 0–0.9)
MONOCYTES NFR BLD AUTO: 5.6 % — SIGNIFICANT CHANGE UP (ref 2–14)
NEUTROPHILS # BLD AUTO: 16.17 K/UL — HIGH (ref 1.8–7.4)
NEUTROPHILS NFR BLD AUTO: 85.9 % — HIGH (ref 43–77)
NRBC # FLD: 0 K/UL — SIGNIFICANT CHANGE UP (ref 0–0)
PHOSPHATE SERPL-MCNC: 4 MG/DL — SIGNIFICANT CHANGE UP (ref 2.5–4.5)
PLATELET # BLD AUTO: 118 K/UL — LOW (ref 150–400)
PMV BLD: 10.7 FL — SIGNIFICANT CHANGE UP (ref 7–13)
POTASSIUM SERPL-MCNC: 5 MMOL/L — SIGNIFICANT CHANGE UP (ref 3.5–5.3)
POTASSIUM SERPL-SCNC: 5 MMOL/L — SIGNIFICANT CHANGE UP (ref 3.5–5.3)
PROT SERPL-MCNC: 8.1 G/DL — SIGNIFICANT CHANGE UP (ref 6–8.3)
RBC # BLD: 4.33 M/UL — SIGNIFICANT CHANGE UP (ref 4.2–5.8)
RBC # FLD: 15.6 % — HIGH (ref 10.3–14.5)
SODIUM SERPL-SCNC: 132 MMOL/L — LOW (ref 135–145)
SPECIMEN SOURCE: SIGNIFICANT CHANGE UP
WBC # BLD: 18.85 K/UL — HIGH (ref 3.8–10.5)
WBC # FLD AUTO: 18.85 K/UL — HIGH (ref 3.8–10.5)

## 2020-03-04 PROCEDURE — 76604 US EXAM CHEST: CPT | Mod: 26

## 2020-03-04 PROCEDURE — 93308 TTE F-UP OR LMTD: CPT | Mod: 26

## 2020-03-04 PROCEDURE — 99291 CRITICAL CARE FIRST HOUR: CPT | Mod: 25

## 2020-03-04 PROCEDURE — 71045 X-RAY EXAM CHEST 1 VIEW: CPT | Mod: 26

## 2020-03-04 RX ORDER — GABAPENTIN 400 MG/1
300 CAPSULE ORAL THREE TIMES A DAY
Refills: 0 | Status: DISCONTINUED | OUTPATIENT
Start: 2020-03-04 | End: 2020-03-04

## 2020-03-04 RX ORDER — PIPERACILLIN AND TAZOBACTAM 4; .5 G/20ML; G/20ML
3.38 INJECTION, POWDER, LYOPHILIZED, FOR SOLUTION INTRAVENOUS EVERY 12 HOURS
Refills: 0 | Status: DISCONTINUED | OUTPATIENT
Start: 2020-03-05 | End: 2020-03-06

## 2020-03-04 RX ORDER — GABAPENTIN 400 MG/1
300 CAPSULE ORAL DAILY
Refills: 0 | Status: DISCONTINUED | OUTPATIENT
Start: 2020-03-04 | End: 2020-03-04

## 2020-03-04 RX ORDER — AZITHROMYCIN 500 MG/1
500 TABLET, FILM COATED ORAL ONCE
Refills: 0 | Status: COMPLETED | OUTPATIENT
Start: 2020-03-04 | End: 2020-03-04

## 2020-03-04 RX ORDER — PIPERACILLIN AND TAZOBACTAM 4; .5 G/20ML; G/20ML
3.38 INJECTION, POWDER, LYOPHILIZED, FOR SOLUTION INTRAVENOUS ONCE
Refills: 0 | Status: COMPLETED | OUTPATIENT
Start: 2020-03-04 | End: 2020-03-04

## 2020-03-04 RX ORDER — AZITHROMYCIN 500 MG/1
TABLET, FILM COATED ORAL
Refills: 0 | Status: DISCONTINUED | OUTPATIENT
Start: 2020-03-04 | End: 2020-03-04

## 2020-03-04 RX ORDER — CEFTRIAXONE 500 MG/1
1000 INJECTION, POWDER, FOR SOLUTION INTRAMUSCULAR; INTRAVENOUS ONCE
Refills: 0 | Status: DISCONTINUED | OUTPATIENT
Start: 2020-03-04 | End: 2020-03-04

## 2020-03-04 RX ORDER — CEFTRIAXONE 500 MG/1
INJECTION, POWDER, FOR SOLUTION INTRAMUSCULAR; INTRAVENOUS
Refills: 0 | Status: DISCONTINUED | OUTPATIENT
Start: 2020-03-04 | End: 2020-03-04

## 2020-03-04 RX ORDER — IPRATROPIUM/ALBUTEROL SULFATE 18-103MCG
3 AEROSOL WITH ADAPTER (GRAM) INHALATION ONCE
Refills: 0 | Status: COMPLETED | OUTPATIENT
Start: 2020-03-04 | End: 2020-03-04

## 2020-03-04 RX ADMIN — Medication 1: at 17:34

## 2020-03-04 RX ADMIN — HEPARIN SODIUM 5000 UNIT(S): 5000 INJECTION INTRAVENOUS; SUBCUTANEOUS at 15:05

## 2020-03-04 RX ADMIN — Medication 1: at 05:08

## 2020-03-04 RX ADMIN — HEPARIN SODIUM 5000 UNIT(S): 5000 INJECTION INTRAVENOUS; SUBCUTANEOUS at 05:07

## 2020-03-04 RX ADMIN — Medication 2: at 00:00

## 2020-03-04 RX ADMIN — Medication 20 MILLIGRAM(S): at 21:13

## 2020-03-04 RX ADMIN — PIPERACILLIN AND TAZOBACTAM 200 GRAM(S): 4; .5 INJECTION, POWDER, LYOPHILIZED, FOR SOLUTION INTRAVENOUS at 15:05

## 2020-03-04 RX ADMIN — Medication 1: at 23:31

## 2020-03-04 RX ADMIN — AZITHROMYCIN 255 MILLIGRAM(S): 500 TABLET, FILM COATED ORAL at 12:51

## 2020-03-04 RX ADMIN — PANTOPRAZOLE SODIUM 40 MILLIGRAM(S): 20 TABLET, DELAYED RELEASE ORAL at 15:05

## 2020-03-04 RX ADMIN — Medication 3 MILLILITER(S): at 20:42

## 2020-03-04 RX ADMIN — HEPARIN SODIUM 5000 UNIT(S): 5000 INJECTION INTRAVENOUS; SUBCUTANEOUS at 21:13

## 2020-03-04 NOTE — PROGRESS NOTE ADULT - SUBJECTIVE AND OBJECTIVE BOX
Nephrology Followup Note - [phone] - Dr Crockett / Dr Pa / Dr Garcia / Dr Sapp / Dr Hernadez / Dr Howell / Dr Harper / Dr Carey  Pt seen and examined at bedside  Pt extubated. Awake but remains confused AAOx1.     Allergies:  No Known Allergies    Hospital Medications:   MEDICATIONS  (STANDING):  aspirin  chewable 81 milliGRAM(s) Oral daily  chlorhexidine 4% Liquid 1 Application(s) Topical <User Schedule>  clopidogrel Tablet 75 milliGRAM(s) Oral daily  cyanocobalamin 1000 MICROGram(s) Oral daily  dextrose 5% + lactated ringers. 1000 milliLiter(s) (50 mL/Hr) IV Continuous <Continuous>  folic acid 1 milliGRAM(s) Oral daily  gabapentin 300 milliGRAM(s) Oral daily  heparin  Injectable 5000 Unit(s) SubCutaneous every 8 hours  insulin lispro (HumaLOG) corrective regimen sliding scale   SubCutaneous every 6 hours  norepinephrine Infusion 0.05 MICROgram(s)/kG/Min (9.881 mL/Hr) IV Continuous <Continuous>  oseltamivir Suspension 30 milliGRAM(s) Oral daily  pantoprazole  Injectable 40 milliGRAM(s) IV Push daily    VITALS:  T(F): 97.5 (03-04-20 @ 08:00), Max: 99.4 (03-03-20 @ 12:00)  HR: 75 (03-04-20 @ 09:00)  BP: 121/45 (03-04-20 @ 09:00)  RR: 13 (03-04-20 @ 09:00)  SpO2: 99% (03-04-20 @ 09:00)  Wt(kg): --    03-03 @ 07:01  -  03-04 @ 07:00  --------------------------------------------------------  IN: 2118.9 mL / OUT: 1200 mL / NET: 918.9 mL        PHYSICAL EXAM:  Constitutional: NAD  HEENT: anicteric sclera, oropharynx clear, MMM  Neck: No JVD  Respiratory: CTAB, no wheezes, rales or rhonchi  Cardiovascular: S1, S2, RRR  Gastrointestinal: BS+, soft, NT/ND  Extremities: No cyanosis or clubbing. No peripheral edema  Neurological: A/O x 1  : No CVA tenderness. No de luna.   Skin: No rashes  Vascular Access: LUE AV access +thrill and bruit.     LABS:  03-04    132<L>  |  91<L>  |  34<H>  ----------------------------<  201<H>  5.0   |  24  |  7.37<H>    Ca    8.7      04 Mar 2020 02:35  Phos  4.0     03-04  Mg     1.8     03-04    TPro  8.1  /  Alb  3.5  /  TBili  0.4  /  DBili      /  AST  19  /  ALT  19  /  AlkPhos  144<H>  03-04    Creatinine Trend: 7.37 <--, 9.23 <--, 8.66 <--, 8.39 <--, 8.28 <--                        13.2   18.85 )-----------( 118      ( 04 Mar 2020 02:35 )             41.7     Urine Studies:      RADIOLOGY & ADDITIONAL STUDIES:

## 2020-03-04 NOTE — CHART NOTE - NSCHARTNOTEFT_GEN_A_CORE
MICU Transfer Note    Transfer from: MICU    Transfer to: ( X  ) Medicine    (  ) Telemetry     (   ) RCU        (    ) Palliative         (   ) Stroke Unit          (   ) __________________    Accepting Physician:  Signout given to:     MICU COURSE:    64 yom PMH HTN, DM, ESRD on dialysis, wheelchair bound, presents to the ED by ambulance from an outpatient vascular office. Patient was having a procedure performed on his left forearm AV fistula. He received 50 mcg Fentanyl, 2 mg Versed, and subsequently became unresponsive and bradypneic. Patient administered Narcan, Flumazenil, and Dextrose IM by office staff without improvement.  In ED: patient given additional 2 mg Narcan without improvement. Patient subsequently intubated for airway protection and concern of hypercarbic respiratory failure. Vitals notable for temp of 93.4*F. CT head and CXR performed with results pending. Patient transferred to ICU for continued care on 3/2.     Patient weaned off sedation and extubated overnight on 3/4. Zosyn started for presumed aspiration PNA given doubling of white blood cell count and coarse upper airway breath sounds after extubation.     ASSESSMENT & PLAN:             FOR FOLLOW UP: MICU Transfer Note    Transfer from: MICU    Transfer to: (   ) Medicine    (  ) Telemetry     ( X ) RCU        (    ) Palliative         (   ) Stroke Unit          (   ) __________________    Accepting Physician:  Signout given to:     MICU COURSE:    64 yom PMH HTN, DM, ESRD on dialysis, wheelchair bound, presents to the ED by ambulance from an outpatient vascular office. Patient was having a procedure performed on his left forearm AV fistula. He received 50 mcg Fentanyl, 2 mg Versed, and subsequently became unresponsive and bradypneic. Patient administered Narcan, Flumazenil, and Dextrose IM by office staff without improvement.  In ED: patient given additional 2 mg Narcan without improvement. Patient subsequently intubated for airway protection and concern of hypercarbic respiratory failure. Vitals notable for temp of 93.4*F. CT head performed in ED, was unremarkable. Patient transferred to ICU for continued care on 3/2.     Patient weaned off sedation and extubated overnight on 3/4. Zosyn started for presumed aspiration PNA given doubling of white blood cell count and coarse upper airway breath sounds after extubation. Sputum culture growing Hemophilus influenza. Complicated by some encephalopathy thought to be metabolic/infectious (patient A&O x 2 currently, not at his baseline). Patient passed bedside swallow eval overnight into 3/5 and was resumed on soft diet and oral meds (aspirin, plavix, gabapentin, tamiflu).     ASSESSMENT & PLAN:     64 yom PMH HTN, DM, ESRD on dialysis, seizure disorder, presents with altered mental status, likely 2/2 hypercarbic respiratory failure.  Patient administered Narcan, Flumazenil, and Dextrose IM by office staff without improvement. Intubated in ED and sent to MICU for further care. Now extubated, hemodynamically stable.     #Neuro  - concern for AMS/encephalopathy, as pt reported to be A&O x 4 at baseline, but today A&O x 1-2.  - Ammonia levels WNL.   - Extubated now, off sedation.    #CV: HTN  - Monitor vitals  - off pressors, not requiring midodrine at this time. As outpatient, pt gets 5 mg midodrine on dialysis days.   - aspirin/plavix resumed now that pt back on a diet    #Resp: s/p intubation, on 3L NC currently, satting appropriately     #GI: no active issues  -resumed on soft diet today, resume oral medications.     #Endocrine: DM  -ISS   - pt on lantus 20U qhs at home, not resumed yet. Was hypoglycemic around time of admission fingersticks have been stable.     #Renal: ESRD  -Dialysis as necessary (T/Th/Sat), plan for HD this AM, BMP today showed hyperkalemia  -Monitor potassium on BMP    #ID: covering empirically for aspiration PNA, with +Hemophilus influenzae in sputum, with zosyn (3/4- ) (day 2)  - follow up culture sensitivities    #Heme:   -Heparin SQ            FOR FOLLOW UP: MICU Transfer Note    Transfer from: MICU    Transfer to: (   ) Medicine    (  ) Telemetry     ( X ) RCU        (    ) Palliative         (   ) Stroke Unit          (   ) __________________    Accepting Physician:  Signout given to:     MICU COURSE:    64 yom PMH HTN, DM, ESRD on dialysis, wheelchair bound, presents to the ED by ambulance from an outpatient vascular office. Patient was having a procedure performed on his left forearm AV fistula. He received 50 mcg Fentanyl, 2 mg Versed, and subsequently became unresponsive and bradypneic. Patient administered Narcan, Flumazenil, and Dextrose IM by office staff without improvement.  In ED: patient given additional 2 mg Narcan without improvement. Patient subsequently intubated for airway protection and concern of hypercarbic respiratory failure. Vitals notable for temp of 93.4*F. CT head performed in ED, was unremarkable. Patient transferred to ICU for continued care on 3/2.     Patient weaned off sedation and extubated overnight on 3/4. Zosyn started for presumed aspiration PNA given doubling of white blood cell count and coarse upper airway breath sounds after extubation. Sputum culture growing Hemophilus influenza. Complicated by some encephalopathy thought to be metabolic/infectious (patient A&O x 2 currently, not at his baseline). Patient passed bedside swallow eval overnight into 3/5 and was resumed on soft diet and oral meds (aspirin, plavix, gabapentin, tamiflu).     ASSESSMENT & PLAN:     64 yom PMH HTN, DM, ESRD on dialysis, presents with altered mental status, likely 2/2 hypercarbic respiratory failure.  Patient administered Narcan, Flumazenil, and Dextrose IM by office staff without improvement. Intubated in ED and sent to MICU for further care. Now extubated, hemodynamically stable.     #Neuro  - concern for AMS/encephalopathy, as pt reported to be A&O x 4 at baseline, but today A&O x 1-2.  - Ammonia levels WNL.   - Extubated now, off sedation.    #CV: HTN  - Monitor vitals  - off pressors, not requiring midodrine at this time. As outpatient, pt gets 5 mg midodrine on dialysis days.   - aspirin/plavix resumed now that pt back on a diet    #Resp: s/p intubation, on 3L NC currently, satting appropriately     #GI: no active issues  -resumed on soft diet today, resume oral medications.     #Endocrine: DM  -ISS   - pt on lantus 20U qhs at home, not resumed yet. Was hypoglycemic around time of admission fingersticks have been stable.     #Renal: ESRD  -Dialysis as necessary (T/Th/Sat), plan for HD this AM, BMP today showed hyperkalemia  -Monitor potassium on BMP    #ID: covering empirically for aspiration PNA, with +Hemophilus influenzae in sputum, with zosyn (3/4- ) (day 2)  - follow up culture sensitivities    #Heme:   -Heparin SQ            FOR FOLLOW UP:

## 2020-03-04 NOTE — PROGRESS NOTE ADULT - ASSESSMENT
64 yom PMH HTN, DM, ESRD on dialysis, seizure disorder, presents with altered mental status, likely 2/2 hypercarbic respiratory failure.  Patient administered Narcan, Flumazenil, and Dextrose IM by office staff without improvement. Intubated in ED and sent to MICU for further care.  #Neuro  - no active issues. Extubated now, off pressors.   #CV: HTN  -Monitor vitals  -IV anti-hypertensives PRN  -plan to renew home PO medications  #Resp: was extubated overnight, on 3L NC currently.    #GI: no active issues  -plan to renew diet  #Endocrine: DM  -ISS once extubated  #Renal: ESRD  -Dialysis as necessary (T/Th/Sat), last HD was yesterday 3/3  -Monitor potassium on BMP  #ID: no active issues  #Heme:   -Heparin SQ  #Dispo:  -Pending 64 yom PMH HTN, DM, ESRD on dialysis, seizure disorder, presents with altered mental status, likely 2/2 hypercarbic respiratory failure.  Patient administered Narcan, Flumazenil, and Dextrose IM by office staff without improvement. Intubated in ED and sent to MICU for further care.  #Neuro  - no active issues. Extubated now, off pressors.   #CV: HTN  -Monitor vitals  -plan to renew home PO medications when patient is off pressors (coreg 25 mg BID- pt still is on levophed now)  #Resp: was extubated overnight, on 3L NC currently.    #GI: no active issues  -plan to renew diet  #Endocrine: DM  -ISS once extubated  #Renal: ESRD  -Dialysis as necessary (T/Th/Sat), last HD was yesterday 3/3  -Monitor potassium on BMP  #ID: no active issues  #Heme:   -Heparin SQ  #Dispo:  -Pending 64 yom PMH HTN, DM, ESRD on dialysis, seizure disorder, presents with altered mental status, likely 2/2 hypercarbic respiratory failure.  Patient administered Narcan, Flumazenil, and Dextrose IM by office staff without improvement. Intubated in ED and sent to MICU for further care.  #Neuro  - no active issues. Extubated now, off pressors.   #CV: HTN  -Monitor vitals  -plan to renew home PO medications when patient is off pressors (coreg 25 mg BID- pt still is on levophed now)  #Resp: was extubated overnight, on 3L NC currently, satting appropriately   #GI: no active issues  -plan for bedside swallow eval, if passes, renew diet  #Endocrine: DM  -ISS once extubated  #Renal: ESRD  -Dialysis as necessary (T/Th/Sat), last HD was yesterday 3/3  -Monitor potassium on BMP  #ID: no active issues  #Heme:   -Heparin SQ  #Dispo:  -Pending 64 yom PMH HTN, DM, ESRD on dialysis, seizure disorder, presents with altered mental status, likely 2/2 hypercarbic respiratory failure.  Patient administered Narcan, Flumazenil, and Dextrose IM by office staff without improvement. Intubated in ED and sent to MICU for further care.  #Neuro  - concern for AMS/encephalopathy, as pt reported to be A&O x 4 at baseline, but today A&O x 1-2.  - will check TSH, ammonia.  - Extubated now, off sedation.  #CV: HTN  -Monitor vitals  -plan to renew home PO medications when patient has means of feeding (currently NPO), and when off pressors (coreg 25 mg BID- pt still is on levophed now)  #Resp: was extubated overnight, on 3L NC currently, satting appropriately   #GI: no active issues  -failed bedside nursing swallow eval- will attempt to place NGT for midodrine and feeds.   #Endocrine: DM  -ISS once extubated  #Renal: ESRD  -Dialysis as necessary (T/Th/Sat), last HD was yesterday 3/3  -Monitor potassium on BMP  #ID: no active issues  #Heme:   -Heparin SQ  #Dispo:  -Pending

## 2020-03-04 NOTE — DIETITIAN INITIAL EVALUATION ADULT. - PERTINENT LABORATORY DATA
3/4/2020 Sodium 132 low, BUN 34 high, Cr. 7..37 high, Glu 201 high, WBC 18.85 high;  3/3/2020 Hemoglobin A1C 7.5%

## 2020-03-04 NOTE — DIETITIAN INITIAL EVALUATION ADULT. - OTHER INFO
64 yom PMH HTN, DM, ESRD on dialysis, wheelchair bound, presents to the ED by ambulance from an outpatient vascular office. Patient was having a procedure performed on his left forearm AV fistula. Patient subsequently intubated for airway protection and concern of hypercarbic respiratory failure. . CT head and CXR performed . Patient transferred to ICU for continued care on 3/2.   Patient weaned off sedation and extubated overnight on 3/4. Zosyn started for presumed aspiration PNA given doubling of white blood cell count and coarse upper airway breath sounds after extubation. Pt. remains NPO . Pt. alert, disoriented , with 2+ generalized edema , pressure ulcers - L first toe- un stageable , L 2nd toe - DTI ; R planter DTI . Unable to obtain full nutrition hx. @ present , no family @ bed side.

## 2020-03-04 NOTE — PROGRESS NOTE ADULT - SUBJECTIVE AND OBJECTIVE BOX
CHIEF COMPLAINT:    Interval Events:  Overnight patient was extubated, initially to facetent, now on 3L O2 via nasal cannula.       OBJECTIVE:  ICU Vital Signs Last 24 Hrs  T(C): 36.3 (04 Mar 2020 04:00), Max: 38.2 (03 Mar 2020 08:00)  T(F): 97.3 (04 Mar 2020 04:00), Max: 100.8 (03 Mar 2020 08:00)  HR: 73 (04 Mar 2020 06:30) (57 - 83)  BP: 84/40 (04 Mar 2020 06:30) (83/39 - 149/49)  BP(mean): 55 (04 Mar 2020 06:30) (48 - 112)  ABP: --  ABP(mean): --  RR: 18 (04 Mar 2020 06:30) (6 - 60)  SpO2: 100% (04 Mar 2020 06:30) (94% - 100%)    Mode: standby    03-03 @ 07:01  -  03-04 @ 07:00  --------------------------------------------------------  IN: 2118.9 mL / OUT: 1200 mL / NET: 918.9 mL      CAPILLARY BLOOD GLUCOSE      POCT Blood Glucose.: 152 mg/dL (04 Mar 2020 05:05)      PHYSICAL EXAM:  General:   HEENT:   Lymph Nodes:  Neck:   Respiratory:   Cardiovascular:   Abdomen:   Extremities:   Skin:   Neurological:  Psychiatry:    LINES:    HOSPITAL MEDICATIONS:  aspirin  chewable 81 milliGRAM(s) Oral daily  clopidogrel Tablet 75 milliGRAM(s) Oral daily  heparin  Injectable 5000 Unit(s) SubCutaneous every 8 hours    oseltamivir Suspension 30 milliGRAM(s) Oral daily    norepinephrine Infusion 0.05 MICROgram(s)/kG/Min IV Continuous <Continuous>    insulin lispro (HumaLOG) corrective regimen sliding scale   SubCutaneous every 6 hours        pantoprazole  Injectable 40 milliGRAM(s) IV Push daily        cyanocobalamin 1000 MICROGram(s) Oral daily  dextrose 5% + lactated ringers. 1000 milliLiter(s) IV Continuous <Continuous>  folic acid 1 milliGRAM(s) Oral daily      chlorhexidine 4% Liquid 1 Application(s) Topical <User Schedule>        LABS:                        13.2   18.85 )-----------( 118      ( 04 Mar 2020 02:35 )             41.7     Hgb Trend: 13.2<--, 12.0<--, 12.7<--, 12.7<--  03-04    132<L>  |  91<L>  |  34<H>  ----------------------------<  201<H>  5.0   |  24  |  7.37<H>    Ca    8.7      04 Mar 2020 02:35  Phos  4.0     03-04  Mg     1.8     03-04    TPro  8.1  /  Alb  3.5  /  TBili  0.4  /  DBili  x   /  AST  19  /  ALT  19  /  AlkPhos  144<H>  03-04    Creatinine Trend: 7.37<--, 9.23<--, 8.66<--, 8.39<--, 8.28<--  PT/INR - ( 02 Mar 2020 12:50 )   PT: 11.9 SEC;   INR: 1.04          PTT - ( 03 Mar 2020 05:18 )  PTT:32.6 SEC    Arterial Blood Gas:  03-02 @ 19:00  7.39/42/121/25/96.8/0.5  ABG lactate: 2.4    Venous Blood Gas:  03-02 @ 15:49  7.31/65/< 24/26/28.7  VBG Lactate: 1.5  Venous Blood Gas:  03-02 @ 12:50  7.19/90/44/25/65.3  VBG Lactate: 1.3      MICROBIOLOGY:     RADIOLOGY:  [ ] Reviewed and interpreted by me    EKG: CHIEF COMPLAINT:    Interval Events:  Overnight patient was extubated, initially to facetent, now on 3L O2 via nasal cannula. A&O x 2, easily arousable to verbal stimuli.       OBJECTIVE:  ICU Vital Signs Last 24 Hrs  T(C): 36.3 (04 Mar 2020 04:00), Max: 38.2 (03 Mar 2020 08:00)  T(F): 97.3 (04 Mar 2020 04:00), Max: 100.8 (03 Mar 2020 08:00)  HR: 73 (04 Mar 2020 06:30) (57 - 83)  BP: 84/40 (04 Mar 2020 06:30) (83/39 - 149/49)  BP(mean): 55 (04 Mar 2020 06:30) (48 - 112)  ABP: --  ABP(mean): --  RR: 18 (04 Mar 2020 06:30) (6 - 60)  SpO2: 100% (04 Mar 2020 06:30) (94% - 100%)    Mode: standby    03-03 @ 07:01  -  03-04 @ 07:00  --------------------------------------------------------  IN: 2118.9 mL / OUT: 1200 mL / NET: 918.9 mL      CAPILLARY BLOOD GLUCOSE      POCT Blood Glucose.: 152 mg/dL (04 Mar 2020 05:05)      PHYSICAL EXAM:  GENERAL: NAD, easily arouses to verbal stimuli  	HEENT: rt. eye blindness  	RESP: upper airway cough sounds noted, otherwise lungs clear to auscultation bilaterally  	CV: RRR, no murmurs/rubs/gallops, midline well-healed surgical scar  	ABDOMEN: soft, non-tender, non-distended, no guarding, midline well-healed surgical scar below umbilicus   	MSK: no visible deformities  	NEURO: off sedation, A&O x 2, follows commands (squeezes hands, answers questions)  	SKIN: warm, normal color, well perfused, no rash      LINES:    HOSPITAL MEDICATIONS:  aspirin  chewable 81 milliGRAM(s) Oral daily  clopidogrel Tablet 75 milliGRAM(s) Oral daily  heparin  Injectable 5000 Unit(s) SubCutaneous every 8 hours    oseltamivir Suspension 30 milliGRAM(s) Oral daily    norepinephrine Infusion 0.05 MICROgram(s)/kG/Min IV Continuous <Continuous>    insulin lispro (HumaLOG) corrective regimen sliding scale   SubCutaneous every 6 hours        pantoprazole  Injectable 40 milliGRAM(s) IV Push daily        cyanocobalamin 1000 MICROGram(s) Oral daily  dextrose 5% + lactated ringers. 1000 milliLiter(s) IV Continuous <Continuous>  folic acid 1 milliGRAM(s) Oral daily      chlorhexidine 4% Liquid 1 Application(s) Topical <User Schedule>        LABS:                        13.2   18.85 )-----------( 118      ( 04 Mar 2020 02:35 )             41.7     Hgb Trend: 13.2<--, 12.0<--, 12.7<--, 12.7<--  03-04    132<L>  |  91<L>  |  34<H>  ----------------------------<  201<H>  5.0   |  24  |  7.37<H>    Ca    8.7      04 Mar 2020 02:35  Phos  4.0     03-04  Mg     1.8     03-04    TPro  8.1  /  Alb  3.5  /  TBili  0.4  /  DBili  x   /  AST  19  /  ALT  19  /  AlkPhos  144<H>  03-04    Creatinine Trend: 7.37<--, 9.23<--, 8.66<--, 8.39<--, 8.28<--  PT/INR - ( 02 Mar 2020 12:50 )   PT: 11.9 SEC;   INR: 1.04          PTT - ( 03 Mar 2020 05:18 )  PTT:32.6 SEC    Arterial Blood Gas:  03-02 @ 19:00  7.39/42/121/25/96.8/0.5  ABG lactate: 2.4    Venous Blood Gas:  03-02 @ 15:49  7.31/65/< 24/26/28.7  VBG Lactate: 1.5  Venous Blood Gas:  03-02 @ 12:50  7.19/90/44/25/65.3  VBG Lactate: 1.3      MICROBIOLOGY:     RADIOLOGY:  [ ] Reviewed and interpreted by me    EKG:

## 2020-03-05 LAB
ALBUMIN SERPL ELPH-MCNC: 3.5 G/DL — SIGNIFICANT CHANGE UP (ref 3.3–5)
ALP SERPL-CCNC: 140 U/L — HIGH (ref 40–120)
ALT FLD-CCNC: 19 U/L — SIGNIFICANT CHANGE UP (ref 4–41)
AMMONIA BLD-MCNC: 22 UMOL/L — SIGNIFICANT CHANGE UP (ref 11–55)
ANION GAP SERPL CALC-SCNC: 20 MMO/L — HIGH (ref 7–14)
AST SERPL-CCNC: 18 U/L — SIGNIFICANT CHANGE UP (ref 4–40)
BASE EXCESS BLDV CALC-SCNC: -0.1 MMOL/L — SIGNIFICANT CHANGE UP
BASOPHILS # BLD AUTO: 0.01 K/UL — SIGNIFICANT CHANGE UP (ref 0–0.2)
BASOPHILS NFR BLD AUTO: 0.1 % — SIGNIFICANT CHANGE UP (ref 0–2)
BILIRUB SERPL-MCNC: 0.6 MG/DL — SIGNIFICANT CHANGE UP (ref 0.2–1.2)
BLOOD GAS VENOUS - CREATININE: 9.07 MG/DL — HIGH (ref 0.5–1.3)
BUN SERPL-MCNC: 48 MG/DL — HIGH (ref 7–23)
CALCIUM SERPL-MCNC: 8.9 MG/DL — SIGNIFICANT CHANGE UP (ref 8.4–10.5)
CHLORIDE BLDV-SCNC: 96 MMOL/L — SIGNIFICANT CHANGE UP (ref 96–108)
CHLORIDE SERPL-SCNC: 90 MMOL/L — LOW (ref 98–107)
CO2 SERPL-SCNC: 23 MMOL/L — SIGNIFICANT CHANGE UP (ref 22–31)
CREAT SERPL-MCNC: 8.98 MG/DL — HIGH (ref 0.5–1.3)
EOSINOPHIL # BLD AUTO: 0 K/UL — SIGNIFICANT CHANGE UP (ref 0–0.5)
EOSINOPHIL NFR BLD AUTO: 0 % — SIGNIFICANT CHANGE UP (ref 0–6)
GAS PNL BLDV: 133 MMOL/L — LOW (ref 136–146)
GLUCOSE BLDC GLUCOMTR-MCNC: 135 MG/DL — HIGH (ref 70–99)
GLUCOSE BLDC GLUCOMTR-MCNC: 230 MG/DL — HIGH (ref 70–99)
GLUCOSE BLDC GLUCOMTR-MCNC: 244 MG/DL — HIGH (ref 70–99)
GLUCOSE BLDC GLUCOMTR-MCNC: 315 MG/DL — HIGH (ref 70–99)
GLUCOSE BLDC GLUCOMTR-MCNC: 350 MG/DL — HIGH (ref 70–99)
GLUCOSE BLDV-MCNC: 235 MG/DL — HIGH (ref 70–99)
GLUCOSE SERPL-MCNC: 239 MG/DL — HIGH (ref 70–99)
HCO3 BLDV-SCNC: 22 MMOL/L — SIGNIFICANT CHANGE UP (ref 20–27)
HCT VFR BLD CALC: 40.3 % — SIGNIFICANT CHANGE UP (ref 39–50)
HCT VFR BLDV CALC: 38.1 % — LOW (ref 39–51)
HGB BLD-MCNC: 12.4 G/DL — LOW (ref 13–17)
HGB BLDV-MCNC: 12.4 G/DL — LOW (ref 13–17)
IMM GRANULOCYTES NFR BLD AUTO: 0.4 % — SIGNIFICANT CHANGE UP (ref 0–1.5)
LACTATE BLDV-MCNC: 1.8 MMOL/L — SIGNIFICANT CHANGE UP (ref 0.5–2)
LYMPHOCYTES # BLD AUTO: 0.53 K/UL — LOW (ref 1–3.3)
LYMPHOCYTES # BLD AUTO: 3.9 % — LOW (ref 13–44)
MAGNESIUM SERPL-MCNC: 2.1 MG/DL — SIGNIFICANT CHANGE UP (ref 1.6–2.6)
MCHC RBC-ENTMCNC: 30 PG — SIGNIFICANT CHANGE UP (ref 27–34)
MCHC RBC-ENTMCNC: 30.8 % — LOW (ref 32–36)
MCV RBC AUTO: 97.3 FL — SIGNIFICANT CHANGE UP (ref 80–100)
MONOCYTES # BLD AUTO: 0.27 K/UL — SIGNIFICANT CHANGE UP (ref 0–0.9)
MONOCYTES NFR BLD AUTO: 2 % — SIGNIFICANT CHANGE UP (ref 2–14)
NEUTROPHILS # BLD AUTO: 12.81 K/UL — HIGH (ref 1.8–7.4)
NEUTROPHILS NFR BLD AUTO: 93.6 % — HIGH (ref 43–77)
NRBC # FLD: 0 K/UL — SIGNIFICANT CHANGE UP (ref 0–0)
PCO2 BLDV: 61 MMHG — HIGH (ref 41–51)
PH BLDV: 7.26 PH — LOW (ref 7.32–7.43)
PHOSPHATE SERPL-MCNC: 5.4 MG/DL — HIGH (ref 2.5–4.5)
PLATELET # BLD AUTO: 121 K/UL — LOW (ref 150–400)
PMV BLD: 11 FL — SIGNIFICANT CHANGE UP (ref 7–13)
PO2 BLDV: 37 MMHG — SIGNIFICANT CHANGE UP (ref 35–40)
POTASSIUM BLDV-SCNC: 5.4 MMOL/L — HIGH (ref 3.4–4.5)
POTASSIUM SERPL-MCNC: 6 MMOL/L — HIGH (ref 3.5–5.3)
POTASSIUM SERPL-SCNC: 6 MMOL/L — HIGH (ref 3.5–5.3)
PROT SERPL-MCNC: 8.4 G/DL — HIGH (ref 6–8.3)
RBC # BLD: 4.14 M/UL — LOW (ref 4.2–5.8)
RBC # FLD: 15.8 % — HIGH (ref 10.3–14.5)
SAO2 % BLDV: 58.4 % — LOW (ref 60–85)
SODIUM SERPL-SCNC: 133 MMOL/L — LOW (ref 135–145)
WBC # BLD: 13.67 K/UL — HIGH (ref 3.8–10.5)
WBC # FLD AUTO: 13.67 K/UL — HIGH (ref 3.8–10.5)

## 2020-03-05 PROCEDURE — 99233 SBSQ HOSP IP/OBS HIGH 50: CPT | Mod: GC

## 2020-03-05 RX ORDER — CLOPIDOGREL BISULFATE 75 MG/1
75 TABLET, FILM COATED ORAL DAILY
Refills: 0 | Status: DISCONTINUED | OUTPATIENT
Start: 2020-03-05 | End: 2020-03-10

## 2020-03-05 RX ORDER — ASPIRIN/CALCIUM CARB/MAGNESIUM 324 MG
81 TABLET ORAL DAILY
Refills: 0 | Status: DISCONTINUED | OUTPATIENT
Start: 2020-03-05 | End: 2020-03-06

## 2020-03-05 RX ORDER — INSULIN HUMAN 100 [IU]/ML
5 INJECTION, SOLUTION SUBCUTANEOUS ONCE
Refills: 0 | Status: DISCONTINUED | OUTPATIENT
Start: 2020-03-05 | End: 2020-03-05

## 2020-03-05 RX ORDER — DEXTROSE 50 % IN WATER 50 %
25 SYRINGE (ML) INTRAVENOUS ONCE
Refills: 0 | Status: DISCONTINUED | OUTPATIENT
Start: 2020-03-05 | End: 2020-03-05

## 2020-03-05 RX ORDER — DEXTROSE 50 % IN WATER 50 %
25 SYRINGE (ML) INTRAVENOUS ONCE
Refills: 0 | Status: DISCONTINUED | OUTPATIENT
Start: 2020-03-05 | End: 2020-03-10

## 2020-03-05 RX ORDER — INSULIN LISPRO 100/ML
VIAL (ML) SUBCUTANEOUS AT BEDTIME
Refills: 0 | Status: DISCONTINUED | OUTPATIENT
Start: 2020-03-05 | End: 2020-03-10

## 2020-03-05 RX ORDER — DEXTROSE 50 % IN WATER 50 %
50 SYRINGE (ML) INTRAVENOUS ONCE
Refills: 0 | Status: DISCONTINUED | OUTPATIENT
Start: 2020-03-05 | End: 2020-03-05

## 2020-03-05 RX ORDER — INSULIN LISPRO 100/ML
VIAL (ML) SUBCUTANEOUS
Refills: 0 | Status: DISCONTINUED | OUTPATIENT
Start: 2020-03-05 | End: 2020-03-10

## 2020-03-05 RX ORDER — GLUCAGON INJECTION, SOLUTION 0.5 MG/.1ML
1 INJECTION, SOLUTION SUBCUTANEOUS ONCE
Refills: 0 | Status: DISCONTINUED | OUTPATIENT
Start: 2020-03-05 | End: 2020-03-05

## 2020-03-05 RX ORDER — DEXTROSE 50 % IN WATER 50 %
12.5 SYRINGE (ML) INTRAVENOUS ONCE
Refills: 0 | Status: DISCONTINUED | OUTPATIENT
Start: 2020-03-05 | End: 2020-03-10

## 2020-03-05 RX ORDER — SODIUM CHLORIDE 9 MG/ML
1000 INJECTION, SOLUTION INTRAVENOUS
Refills: 0 | Status: DISCONTINUED | OUTPATIENT
Start: 2020-03-05 | End: 2020-03-05

## 2020-03-05 RX ORDER — GABAPENTIN 400 MG/1
300 CAPSULE ORAL DAILY
Refills: 0 | Status: DISCONTINUED | OUTPATIENT
Start: 2020-03-05 | End: 2020-03-10

## 2020-03-05 RX ORDER — ALBUTEROL 90 UG/1
10 AEROSOL, METERED ORAL ONCE
Refills: 0 | Status: DISCONTINUED | OUTPATIENT
Start: 2020-03-05 | End: 2020-03-05

## 2020-03-05 RX ORDER — SODIUM ZIRCONIUM CYCLOSILICATE 10 G/10G
5 POWDER, FOR SUSPENSION ORAL ONCE
Refills: 0 | Status: DISCONTINUED | OUTPATIENT
Start: 2020-03-05 | End: 2020-03-05

## 2020-03-05 RX ORDER — DEXTROSE 50 % IN WATER 50 %
15 SYRINGE (ML) INTRAVENOUS ONCE
Refills: 0 | Status: DISCONTINUED | OUTPATIENT
Start: 2020-03-05 | End: 2020-03-05

## 2020-03-05 RX ADMIN — HEPARIN SODIUM 5000 UNIT(S): 5000 INJECTION INTRAVENOUS; SUBCUTANEOUS at 21:12

## 2020-03-05 RX ADMIN — Medication 30 MILLIGRAM(S): at 12:19

## 2020-03-05 RX ADMIN — GABAPENTIN 300 MILLIGRAM(S): 400 CAPSULE ORAL at 12:18

## 2020-03-05 RX ADMIN — PIPERACILLIN AND TAZOBACTAM 25 GRAM(S): 4; .5 INJECTION, POWDER, LYOPHILIZED, FOR SOLUTION INTRAVENOUS at 12:40

## 2020-03-05 RX ADMIN — PANTOPRAZOLE SODIUM 40 MILLIGRAM(S): 20 TABLET, DELAYED RELEASE ORAL at 12:19

## 2020-03-05 RX ADMIN — Medication 2: at 05:47

## 2020-03-05 RX ADMIN — PIPERACILLIN AND TAZOBACTAM 25 GRAM(S): 4; .5 INJECTION, POWDER, LYOPHILIZED, FOR SOLUTION INTRAVENOUS at 01:32

## 2020-03-05 RX ADMIN — HEPARIN SODIUM 5000 UNIT(S): 5000 INJECTION INTRAVENOUS; SUBCUTANEOUS at 12:40

## 2020-03-05 RX ADMIN — Medication 20 MILLIGRAM(S): at 05:39

## 2020-03-05 RX ADMIN — CLOPIDOGREL BISULFATE 75 MILLIGRAM(S): 75 TABLET, FILM COATED ORAL at 12:18

## 2020-03-05 RX ADMIN — Medication 20 MILLIGRAM(S): at 12:40

## 2020-03-05 RX ADMIN — CHLORHEXIDINE GLUCONATE 1 APPLICATION(S): 213 SOLUTION TOPICAL at 12:18

## 2020-03-05 RX ADMIN — HEPARIN SODIUM 5000 UNIT(S): 5000 INJECTION INTRAVENOUS; SUBCUTANEOUS at 05:39

## 2020-03-05 RX ADMIN — Medication 2: at 22:53

## 2020-03-05 RX ADMIN — Medication 81 MILLIGRAM(S): at 12:18

## 2020-03-05 RX ADMIN — Medication 2: at 17:25

## 2020-03-05 NOTE — SWALLOW BEDSIDE ASSESSMENT ADULT - COMMENTS
H&P: 64 yom PMH HTN, DM, ESRD on dialysis, seizure disorder, presents with altered mental status, likely 2/2 hypercarbic respiratory failure.  Patient administered Narcan, Flumazenil, and Dextrose IM by office staff without improvement. Intubated in ED and sent to MICU for further care.    CTH 3/2: no acute intracranial hemorrhage, mass effect or midline shift    Patient seen upright at bedside. Patient was alert/awake with variable incorrect responses to simple questions (i.e. did not know day of the week) and responded via yes/no responses. Patient able to follow simple directions when given prompted. Patient presents with low vocal volume prior to PO presentations which he reports is not his "normal voice". BASELINE STATS: SpO2 97%; RR 21

## 2020-03-05 NOTE — PROGRESS NOTE ADULT - SUBJECTIVE AND OBJECTIVE BOX
CHIEF COMPLAINT:    Interval Events: Overnight patient passed bedside swallow evaluation, resumed on soft diet. Patient had stridor briefly yesterday evening, given solumedrol 20 mg IV ordered for 3 doses (received 2 thus far)- stridor resolved.         OBJECTIVE:  ICU Vital Signs Last 24 Hrs  T(C): 37.1 (05 Mar 2020 04:00), Max: 37.1 (04 Mar 2020 20:00)  T(F): 98.8 (05 Mar 2020 04:00), Max: 98.8 (04 Mar 2020 20:00)  HR: 82 (05 Mar 2020 05:00) (66 - 85)  BP: 123/79 (05 Mar 2020 05:00) (99/42 - 160/68)  BP(mean): 90 (05 Mar 2020 05:00) (50 - 90)  ABP: --  ABP(mean): --  RR: 19 (05 Mar 2020 05:00) (13 - 24)  SpO2: 100% (05 Mar 2020 05:00) (93% - 100%)        03-04 @ 07:01  -  03-05 @ 07:00  --------------------------------------------------------  IN: 1012 mL / OUT: 0 mL / NET: 1012 mL      CAPILLARY BLOOD GLUCOSE      POCT Blood Glucose.: 230 mg/dL (05 Mar 2020 05:25)      PHYSICAL EXAM:  General:   HEENT:   Lymph Nodes:  Neck:   Respiratory:   Cardiovascular:   Abdomen:   Extremities:   Skin:   Neurological:  Psychiatry:    LINES:    HOSPITAL MEDICATIONS:  aspirin  chewable 81 milliGRAM(s) Oral daily  clopidogrel Tablet 75 milliGRAM(s) Oral daily  heparin  Injectable 5000 Unit(s) SubCutaneous every 8 hours    oseltamivir Suspension 30 milliGRAM(s) Oral daily  piperacillin/tazobactam IVPB.. 3.375 Gram(s) IV Intermittent every 12 hours      dextrose 40% Gel 15 Gram(s) Oral once PRN  dextrose 50% Injectable 12.5 Gram(s) IV Push once  dextrose 50% Injectable 25 Gram(s) IV Push once  dextrose 50% Injectable 25 Gram(s) IV Push once  glucagon  Injectable 1 milliGRAM(s) IntraMuscular once PRN  insulin lispro (HumaLOG) corrective regimen sliding scale   SubCutaneous every 6 hours  methylPREDNISolone sodium succinate Injectable 20 milliGRAM(s) IV Push every 8 hours      gabapentin 300 milliGRAM(s) Oral daily    pantoprazole  Injectable 40 milliGRAM(s) IV Push daily        dextrose 5%. 1000 milliLiter(s) IV Continuous <Continuous>      chlorhexidine 4% Liquid 1 Application(s) Topical <User Schedule>        LABS:                        12.4   13.67 )-----------( 121      ( 05 Mar 2020 03:35 )             40.3     Hgb Trend: 12.4<--, 13.2<--, 12.0<--, 12.7<--, 12.7<--  03-05    133<L>  |  90<L>  |  48<H>  ----------------------------<  239<H>  6.0<H>   |  23  |  8.98<H>    Ca    8.9      05 Mar 2020 03:35  Phos  5.4     03-05  Mg     2.1     03-05    TPro  8.4<H>  /  Alb  3.5  /  TBili  0.6  /  DBili  x   /  AST  18  /  ALT  19  /  AlkPhos  140<H>  03-05    Creatinine Trend: 8.98<--, 7.37<--, 9.23<--, 8.66<--, 8.39<--, 8.28<--        Venous Blood Gas:  03-05 @ 03:35  7.26/61/37/22/58.4  VBG Lactate: 1.8      MICROBIOLOGY:     RADIOLOGY:  [ ] Reviewed and interpreted by me    EKG:

## 2020-03-05 NOTE — SWALLOW BEDSIDE ASSESSMENT ADULT - ORAL PHASE
Within functional limits Decreased anterior-posterior movement of the bolus Delayed oral transit time/Decreased anterior-posterior movement of the bolus/Lingual stasis

## 2020-03-05 NOTE — PROGRESS NOTE ADULT - ASSESSMENT
64 yom PMH HTN, DM, ESRD on dialysis, seizure disorder, presents with altered mental status, likely 2/2 hypercarbic respiratory failure.  Patient administered Narcan, Flumazenil, and Dextrose IM by office staff without improvement. Intubated in ED and sent to MICU for further care.  #Neuro  - concern for AMS/encephalopathy, as pt reported to be A&O x 4 at baseline, but today A&O x 1-2.  - will check TSH, ammonia.  - Extubated now, off sedation.  #CV: HTN  -Monitor vitals  -weaning off pressors  - aspirin/plavix resumed now that pt back on a diet    #Resp: s/p intubation, on 3L NC currently, satting appropriately   #GI: no active issues  -resumed on soft diet today, resume oral medications.   #Endocrine: DM  -ISS   #Renal: ESRD  -Dialysis as necessary (T/Th/Sat), getting HD early this AM, BMP today showed hyperkalemia  -Monitor potassium on BMP  #ID: covering empirically for aspiration PNA, with zosyn (3/4- ) (day 2)  #Heme:   -Heparin SQ  #Dispo:  -may be bedboarded to medicine unit later today. 64 yom PMH HTN, DM, ESRD on dialysis,  presents with altered mental status, likely 2/2 hypercarbic respiratory failure.  Patient administered Narcan, Flumazenil, and Dextrose IM by office staff without improvement. Intubated in ED and sent to MICU for further care.  #Neuro  - concern for AMS/encephalopathy, as pt reported to be A&O x 4 at baseline, but today A&O x 1-2.  - will check TSH, ammonia.  - Extubated now, off sedation.  #CV: HTN  -Monitor vitals  -weaning off pressors  - aspirin/plavix resumed now that pt back on a diet    #Resp: s/p intubation, on 3L NC currently, satting appropriately   #GI: no active issues  -resumed on soft diet today, resume oral medications.   #Endocrine: DM  -ISS   #Renal: ESRD  -Dialysis as necessary (T/Th/Sat), getting HD early this AM, BMP today showed hyperkalemia  -Monitor potassium on BMP  #ID: covering empirically for aspiration PNA, with zosyn (3/4- ) (day 2)  #Heme:   -Heparin SQ  #Dispo:  -may be bedboarded to medicine unit later today. 64 yom PMH HTN, DM, ESRD on dialysis,  presents with altered mental status, likely 2/2 hypercarbic respiratory failure.  Patient administered Narcan, Flumazenil, and Dextrose IM by office staff without improvement. Intubated in ED and sent to MICU for further care.  #Neuro  - concern for AMS/encephalopathy, as pt reported to be A&O x 4 at baseline, but today A&O x 1-2, although patient more coherent today and mental status improved from yesterday  - ammonia level WNL  - Extubated now, off sedation.  #CV: HTN  -Monitor vitals  -weaning off pressors  - aspirin/plavix resumed now that pt back on a diet    #Resp: s/p intubation, on 3L NC currently, satting appropriately   #GI: no active issues  -resumed on soft diet today, resume oral medications.   #Endocrine: DM  -ISS   #Renal: ESRD  -Dialysis as necessary (T/Th/Sat), getting HD early this AM, BMP today showed hyperkalemia  -Monitor potassium on BMP  #ID: covering empirically for aspiration PNA, with zosyn (3/4- ) (day 2)  pt growing hemophilus influenzae in sputum culture- f/u sensitivities.  #Heme:   -Heparin SQ  #Dispo:  -will be transferred to RCU today.

## 2020-03-05 NOTE — SWALLOW BEDSIDE ASSESSMENT ADULT - SWALLOW EVAL: DIAGNOSIS
Limited Assessment given patient accepted minimal PO trials. Patient demonstrated 1. Functional oral phase for puree consistency marked by adequate bolus manipulation and functional oral transit time 2. Mild to Moderate oral phase dysphagia for mechanical soft solids and thin liquids marked by reduced labial seal with anterior loss for thin liquids, extended/prolonged mastication for solids, reduced bolus manipulation and delay in oral transit time. Reduced oral clearance marked by moderate lingual stasis 3. Mild pharyngeal phase dysphagia for all consistencies presented marked by suspected delay in initiation of pharyngeal swallow upon digital palpation with evidence of wet vocal quality subsequent thin liquids indicative of laryngeal penetration/aspiration. Of Note, patient with minimal acceptance of PO trials (3x teaspoon puree, 1x teaspoon mechanical soft and 2x sips of thin liquids) and verbally refused further PO presentations and reported "get out of here"/ "no more".

## 2020-03-05 NOTE — PROGRESS NOTE ADULT - ASSESSMENT
64y Male NH resident with HTN, DM, CAD s/p CABG ESRD on dialysis TTS, seizure disorder, BIBEMS from outpatient vascular office, for unresponsiveness after angioplasty. +Flu

## 2020-03-05 NOTE — SWALLOW BEDSIDE ASSESSMENT ADULT - ASR SWALLOW ASPIRATION MONITOR
oral hygiene/position upright (90Y)/throat clearing/change of breathing pattern/cough/fever/pneumonia/gurgly voice/upper respiratory infection

## 2020-03-05 NOTE — PROGRESS NOTE ADULT - SUBJECTIVE AND OBJECTIVE BOX
Nephrology Followup Note - [phone] - Dr Crockett / Dr Pa / Dr Garcia / Dr Sapp / Dr Hernadez / Dr Howell / Dr Harper / Dr Carey  Pt seen and examined during HD at bedside  Pt more awake and alert this morning. More oriented than yesterday, but still not at baseline.     Allergies:  No Known Allergies    Hospital Medications:   MEDICATIONS  (STANDING):  aspirin  chewable 81 milliGRAM(s) Oral daily  chlorhexidine 4% Liquid 1 Application(s) Topical <User Schedule>  clopidogrel Tablet 75 milliGRAM(s) Oral daily  dextrose 50% Injectable 12.5 Gram(s) IV Push once  dextrose 50% Injectable 25 Gram(s) IV Push once  gabapentin 300 milliGRAM(s) Oral daily  heparin  Injectable 5000 Unit(s) SubCutaneous every 8 hours  insulin lispro (HumaLOG) corrective regimen sliding scale   SubCutaneous every 6 hours  methylPREDNISolone sodium succinate Injectable 20 milliGRAM(s) IV Push every 8 hours  oseltamivir Suspension 30 milliGRAM(s) Oral daily  pantoprazole  Injectable 40 milliGRAM(s) IV Push daily  piperacillin/tazobactam IVPB.. 3.375 Gram(s) IV Intermittent every 12 hours      VITALS:  T(F): 98.7 (03-05-20 @ 09:01), Max: 98.8 (03-04-20 @ 20:00)  HR: 85 (03-05-20 @ 09:01)  BP: 142/46 (03-05-20 @ 09:01)  RR: 20 (03-05-20 @ 09:01)  SpO2: 91% (03-05-20 @ 09:00)  Wt(kg): --    03-04 @ 07:01  -  03-05 @ 07:00  --------------------------------------------------------  IN: 1012 mL / OUT: 0 mL / NET: 1012 mL        PHYSICAL EXAM:  Constitutional: NAD  HEENT: anicteric sclera, oropharynx clear, MMM  Neck: No JVD  Respiratory: CTAB, no wheezes, rales or rhonchi  Cardiovascular: S1, S2, RRR  Gastrointestinal: BS+, soft, NT/ND  Extremities: No cyanosis or clubbing. No peripheral edema  Neurological: A/O x 2  : No CVA tenderness. No de luna.   Skin: No rashes  Vascular Access: LUE AV access +thrill and bruit.     LABS:  03-05    133<L>  |  90<L>  |  48<H>  ----------------------------<  239<H>  6.0<H>   |  23  |  8.98<H>    Ca    8.9      05 Mar 2020 03:35  Phos  5.4     03-05  Mg     2.1     03-05    TPro  8.4<H>  /  Alb  3.5  /  TBili  0.6  /  DBili      /  AST  18  /  ALT  19  /  AlkPhos  140<H>  03-05    Creatinine Trend: 8.98 <--, 7.37 <--, 9.23 <--, 8.66 <--, 8.39 <--, 8.28 <--                        12.4   13.67 )-----------( 121      ( 05 Mar 2020 03:35 )             40.3     Urine Studies:      RADIOLOGY & ADDITIONAL STUDIES:

## 2020-03-06 ENCOUNTER — TRANSCRIPTION ENCOUNTER (OUTPATIENT)
Age: 65
End: 2020-03-06

## 2020-03-06 LAB
ANION GAP SERPL CALC-SCNC: 18 MMO/L — HIGH (ref 7–14)
BUN SERPL-MCNC: 46 MG/DL — HIGH (ref 7–23)
CALCIUM SERPL-MCNC: 8.5 MG/DL — SIGNIFICANT CHANGE UP (ref 8.4–10.5)
CHLORIDE SERPL-SCNC: 89 MMOL/L — LOW (ref 98–107)
CHOLEST SERPL-MCNC: 199 MG/DL — SIGNIFICANT CHANGE UP (ref 120–199)
CO2 SERPL-SCNC: 24 MMOL/L — SIGNIFICANT CHANGE UP (ref 22–31)
CREAT SERPL-MCNC: 7.07 MG/DL — HIGH (ref 0.5–1.3)
GLUCOSE BLDC GLUCOMTR-MCNC: 152 MG/DL — HIGH (ref 70–99)
GLUCOSE BLDC GLUCOMTR-MCNC: 205 MG/DL — HIGH (ref 70–99)
GLUCOSE BLDC GLUCOMTR-MCNC: 235 MG/DL — HIGH (ref 70–99)
GLUCOSE BLDC GLUCOMTR-MCNC: 302 MG/DL — HIGH (ref 70–99)
GLUCOSE SERPL-MCNC: 390 MG/DL — HIGH (ref 70–99)
HCT VFR BLD CALC: 38 % — LOW (ref 39–50)
HDLC SERPL-MCNC: 54 MG/DL — SIGNIFICANT CHANGE UP (ref 35–55)
HGB BLD-MCNC: 11.6 G/DL — LOW (ref 13–17)
LIPID PNL WITH DIRECT LDL SERPL: 117 MG/DL — SIGNIFICANT CHANGE UP
MCHC RBC-ENTMCNC: 29.9 PG — SIGNIFICANT CHANGE UP (ref 27–34)
MCHC RBC-ENTMCNC: 30.5 % — LOW (ref 32–36)
MCV RBC AUTO: 97.9 FL — SIGNIFICANT CHANGE UP (ref 80–100)
NRBC # FLD: 0.03 K/UL — SIGNIFICANT CHANGE UP (ref 0–0)
PLATELET # BLD AUTO: 134 K/UL — LOW (ref 150–400)
PMV BLD: 10.5 FL — SIGNIFICANT CHANGE UP (ref 7–13)
POTASSIUM SERPL-MCNC: 5.3 MMOL/L — SIGNIFICANT CHANGE UP (ref 3.5–5.3)
POTASSIUM SERPL-SCNC: 5.3 MMOL/L — SIGNIFICANT CHANGE UP (ref 3.5–5.3)
RBC # BLD: 3.88 M/UL — LOW (ref 4.2–5.8)
RBC # FLD: 15.3 % — HIGH (ref 10.3–14.5)
SODIUM SERPL-SCNC: 131 MMOL/L — LOW (ref 135–145)
TRIGL SERPL-MCNC: 93 MG/DL — SIGNIFICANT CHANGE UP (ref 10–149)
WBC # BLD: 10.68 K/UL — HIGH (ref 3.8–10.5)
WBC # FLD AUTO: 10.68 K/UL — HIGH (ref 3.8–10.5)

## 2020-03-06 PROCEDURE — 99233 SBSQ HOSP IP/OBS HIGH 50: CPT

## 2020-03-06 RX ORDER — DOCUSATE SODIUM 100 MG
2 CAPSULE ORAL
Qty: 0 | Refills: 0 | DISCHARGE

## 2020-03-06 RX ORDER — PREGABALIN 225 MG/1
1000 CAPSULE ORAL DAILY
Refills: 0 | Status: DISCONTINUED | OUTPATIENT
Start: 2020-03-06 | End: 2020-03-06

## 2020-03-06 RX ORDER — PANTOPRAZOLE SODIUM 20 MG/1
40 TABLET, DELAYED RELEASE ORAL
Refills: 0 | Status: DISCONTINUED | OUTPATIENT
Start: 2020-03-06 | End: 2020-03-06

## 2020-03-06 RX ORDER — PANTOPRAZOLE SODIUM 20 MG/1
1 TABLET, DELAYED RELEASE ORAL
Qty: 0 | Refills: 0 | DISCHARGE

## 2020-03-06 RX ORDER — CLOPIDOGREL BISULFATE 75 MG/1
1 TABLET, FILM COATED ORAL
Qty: 0 | Refills: 0 | DISCHARGE

## 2020-03-06 RX ORDER — INSULIN LISPRO 100/ML
2 VIAL (ML) SUBCUTANEOUS
Refills: 0 | Status: DISCONTINUED | OUTPATIENT
Start: 2020-03-06 | End: 2020-03-10

## 2020-03-06 RX ORDER — ASPIRIN/CALCIUM CARB/MAGNESIUM 324 MG
1 TABLET ORAL
Qty: 0 | Refills: 0 | DISCHARGE

## 2020-03-06 RX ORDER — PANTOPRAZOLE SODIUM 20 MG/1
40 TABLET, DELAYED RELEASE ORAL
Refills: 0 | Status: DISCONTINUED | OUTPATIENT
Start: 2020-03-06 | End: 2020-03-10

## 2020-03-06 RX ORDER — ERGOCALCIFEROL 1.25 MG/1
1 CAPSULE ORAL
Qty: 0 | Refills: 0 | DISCHARGE

## 2020-03-06 RX ORDER — CEFTRIAXONE 500 MG/1
1000 INJECTION, POWDER, FOR SOLUTION INTRAMUSCULAR; INTRAVENOUS EVERY 24 HOURS
Refills: 0 | Status: DISCONTINUED | OUTPATIENT
Start: 2020-03-06 | End: 2020-03-09

## 2020-03-06 RX ORDER — FOLIC ACID 0.8 MG
1 TABLET ORAL DAILY
Refills: 0 | Status: DISCONTINUED | OUTPATIENT
Start: 2020-03-06 | End: 2020-03-10

## 2020-03-06 RX ORDER — ERGOCALCIFEROL 1.25 MG/1
50000 CAPSULE ORAL
Refills: 0 | Status: DISCONTINUED | OUTPATIENT
Start: 2020-03-06 | End: 2020-03-10

## 2020-03-06 RX ORDER — CARVEDILOL PHOSPHATE 80 MG/1
1 CAPSULE, EXTENDED RELEASE ORAL
Qty: 0 | Refills: 0 | DISCHARGE

## 2020-03-06 RX ORDER — ASPIRIN/CALCIUM CARB/MAGNESIUM 324 MG
81 TABLET ORAL DAILY
Refills: 0 | Status: DISCONTINUED | OUTPATIENT
Start: 2020-03-06 | End: 2020-03-10

## 2020-03-06 RX ORDER — MUPIROCIN 20 MG/G
1 OINTMENT TOPICAL
Qty: 0 | Refills: 0 | DISCHARGE

## 2020-03-06 RX ORDER — FOLIC ACID 0.8 MG
1 TABLET ORAL
Qty: 0 | Refills: 0 | DISCHARGE

## 2020-03-06 RX ORDER — FOLIC ACID 0.8 MG
1 TABLET ORAL DAILY
Refills: 0 | Status: DISCONTINUED | OUTPATIENT
Start: 2020-03-06 | End: 2020-03-06

## 2020-03-06 RX ORDER — MIDODRINE HYDROCHLORIDE 2.5 MG/1
1 TABLET ORAL
Qty: 0 | Refills: 0 | DISCHARGE

## 2020-03-06 RX ORDER — LIDOCAINE AND PRILOCAINE CREAM 25; 25 MG/G; MG/G
1 CREAM TOPICAL
Qty: 0 | Refills: 0 | DISCHARGE

## 2020-03-06 RX ORDER — ERGOCALCIFEROL 1.25 MG/1
50000 CAPSULE ORAL
Refills: 0 | Status: DISCONTINUED | OUTPATIENT
Start: 2020-03-06 | End: 2020-03-06

## 2020-03-06 RX ORDER — PREGABALIN 225 MG/1
1000 CAPSULE ORAL DAILY
Refills: 0 | Status: DISCONTINUED | OUTPATIENT
Start: 2020-03-06 | End: 2020-03-10

## 2020-03-06 RX ORDER — LACTULOSE 10 G/15ML
30 SOLUTION ORAL
Qty: 0 | Refills: 0 | DISCHARGE

## 2020-03-06 RX ORDER — PREGABALIN 225 MG/1
1 CAPSULE ORAL
Qty: 0 | Refills: 0 | DISCHARGE

## 2020-03-06 RX ORDER — INSULIN GLARGINE 100 [IU]/ML
8 INJECTION, SOLUTION SUBCUTANEOUS AT BEDTIME
Refills: 0 | Status: DISCONTINUED | OUTPATIENT
Start: 2020-03-06 | End: 2020-03-10

## 2020-03-06 RX ADMIN — HEPARIN SODIUM 5000 UNIT(S): 5000 INJECTION INTRAVENOUS; SUBCUTANEOUS at 06:19

## 2020-03-06 RX ADMIN — INSULIN GLARGINE 8 UNIT(S): 100 INJECTION, SOLUTION SUBCUTANEOUS at 21:11

## 2020-03-06 RX ADMIN — PANTOPRAZOLE SODIUM 40 MILLIGRAM(S): 20 TABLET, DELAYED RELEASE ORAL at 11:47

## 2020-03-06 RX ADMIN — Medication 2 UNIT(S): at 12:03

## 2020-03-06 RX ADMIN — CLOPIDOGREL BISULFATE 75 MILLIGRAM(S): 75 TABLET, FILM COATED ORAL at 11:47

## 2020-03-06 RX ADMIN — CHLORHEXIDINE GLUCONATE 1 APPLICATION(S): 213 SOLUTION TOPICAL at 06:19

## 2020-03-06 RX ADMIN — GABAPENTIN 300 MILLIGRAM(S): 400 CAPSULE ORAL at 11:46

## 2020-03-06 RX ADMIN — Medication 2: at 17:06

## 2020-03-06 RX ADMIN — HEPARIN SODIUM 5000 UNIT(S): 5000 INJECTION INTRAVENOUS; SUBCUTANEOUS at 21:06

## 2020-03-06 RX ADMIN — HEPARIN SODIUM 5000 UNIT(S): 5000 INJECTION INTRAVENOUS; SUBCUTANEOUS at 14:24

## 2020-03-06 RX ADMIN — Medication 4: at 08:28

## 2020-03-06 RX ADMIN — CEFTRIAXONE 100 MILLIGRAM(S): 500 INJECTION, POWDER, FOR SOLUTION INTRAMUSCULAR; INTRAVENOUS at 11:47

## 2020-03-06 RX ADMIN — PIPERACILLIN AND TAZOBACTAM 25 GRAM(S): 4; .5 INJECTION, POWDER, LYOPHILIZED, FOR SOLUTION INTRAVENOUS at 00:42

## 2020-03-06 RX ADMIN — Medication 2 UNIT(S): at 17:06

## 2020-03-06 RX ADMIN — Medication 81 MILLIGRAM(S): at 11:46

## 2020-03-06 RX ADMIN — Medication 2: at 12:03

## 2020-03-06 NOTE — DISCHARGE NOTE PROVIDER - NSDCCPCAREPLAN_GEN_ALL_CORE_FT
PRINCIPAL DISCHARGE DIAGNOSIS  Diagnosis: Haemophilus influenzae pneumonia  Assessment and Plan of Treatment: Treated with and completed IV antibiotics in the hospital - will require one more dose of Augmentin after next HD day.  Continue with nasal oxygen as needed.  Suction as needed.  Continue to monitor for any further fevers, shortness of breath.      SECONDARY DISCHARGE DIAGNOSES  Diagnosis: Diabetes mellitus  Assessment and Plan of Treatment: Your a1c is 7.5  Continue insulin as directed.  Continue to monitor fingersticks before meals and at bedtime.  Goal fingersticks are 100-180    Diagnosis: ESRD (end stage renal disease) on dialysis  Assessment and Plan of Treatment: Continue HD 3x weekly as per nephrologist.  Last HD session was PRINCIPAL DISCHARGE DIAGNOSIS  Diagnosis: Influenza A  Assessment and Plan of Treatment: Treated with and completed IV antibiotics in the hospital - will require one more dose of Augmentin after next HD day.  Completed tamiflu.  Continue with nasal oxygen as needed.  Suction as needed.  Continue to monitor for any further fevers, shortness of breath.      SECONDARY DISCHARGE DIAGNOSES  Diagnosis: Haemophilus influenzae pneumonia  Assessment and Plan of Treatment: As above.  Now resolved.    Diagnosis: Diabetes mellitus  Assessment and Plan of Treatment: Your a1c is 7.5  Continue insulin as directed.  Continue to monitor fingersticks before meals and at bedtime.  Goal fingersticks are 100-180    Diagnosis: ESRD (end stage renal disease) on dialysis  Assessment and Plan of Treatment: Continue HD 3x weekly as per nephrologist.  Last HD session was

## 2020-03-06 NOTE — PHYSICAL THERAPY INITIAL EVALUATION ADULT - PATIENT PROFILE REVIEW, REHAB EVAL
PT orders received: increase as tolerated. Consult with RN Mallorie HANNA/NATALY Baez, pt may participate in PT evaluation./yes

## 2020-03-06 NOTE — PHYSICAL THERAPY INITIAL EVALUATION ADULT - IMPAIRMENTS FOUND, PT EVAL
Quality 130: Documentation Of Current Medications In The Medical Record: Current Medications Documented Quality 110: Preventive Care And Screening: Influenza Immunization: Influenza Immunization Administered during Influenza season Quality 47: Advance Care Plan: Advance Care Planning discussed and documented; advance care plan or surrogate decision maker documented in the medical record. Detail Level: Detailed Quality 226: Preventive Care And Screening: Tobacco Use: Screening And Cessation Intervention: Patient screened for tobacco and is an ex-smoker Quality 111:Pneumonia Vaccination Status For Older Adults: Pneumococcal Vaccination Previously Received muscle strength/gait, locomotion, and balance/posture

## 2020-03-06 NOTE — DISCHARGE NOTE PROVIDER - NSDCMRMEDTOKEN_GEN_ALL_CORE_FT
aspirin 81 mg oral tablet: 1 tab(s) orally once a day  Bactroban 2% topical ointment: Apply topically to affected area 3 times a day to R toe   bisacodyl 5 mg oral delayed release tablet: 2 tab(s) orally once a day (at bedtime)  Colace 100 mg oral capsule: 2 cap(s) orally once a day (at bedtime)  Coreg 25 mg oral tablet: 1 tab(s) orally 2 times a day  ergocalciferol 50,000 intl units (1.25 mg) oral capsule: 1 cap(s) orally once a week on FRIDAY  folic acid 1 mg oral tablet: 1 tab(s) orally once a day  gabapentin 300 mg oral capsule: 1 cap(s) orally 3 times a day  Lacri-Lube S.O.P. ophthalmic ointment: 1 application to each affected eye 4 times a day  lactulose: 30 milliliter(s) orally 3 times a week HD days for constipation  Lantus 100 units/mL subcutaneous solution: 20 unit(s) subcutaneous once a day (at bedtime)  lidocaine-prilocaine 2.5%-2.5% topical cream: Apply topically to affected area once L arm m,w,f at night  midodrine 5 mg oral tablet: 1 tab(s) orally 3 times a week prior to HD PRN  Plavix 75 mg oral tablet: 1 tab(s) orally once a day  Protonix 40 mg oral delayed release tablet: 1 tab(s) orally once a day  Refresh ophthalmic solution: 1 drop(s) to each affected eye 2 times a day  Tylenol 325 mg oral tablet: 2 tab(s) orally every 6 hours, As Needed  Vitamin B12 1000 mcg oral tablet: 1 tab(s) orally once a day amoxicillin-clavulanate 500 mg-125 mg oral tablet: 1 tab(s) orally once after HD on 3/12/20 then stop  aspirin 81 mg oral tablet: 1 tab(s) orally once a day  Bactroban 2% topical ointment: Apply topically to affected area 3 times a day to R toe   bisacodyl 5 mg oral delayed release tablet: 2 tab(s) orally once a day (at bedtime)  Colace 100 mg oral capsule: 2 cap(s) orally once a day (at bedtime)  Coreg 25 mg oral tablet: 1 tab(s) orally 2 times a day  ergocalciferol 50,000 intl units (1.25 mg) oral capsule: 1 cap(s) orally once a week on FRIDAY  folic acid 1 mg oral tablet: 1 tab(s) orally once a day  gabapentin 300 mg oral capsule: 1 cap(s) orally once a day  HumaLOG 100 units/mL subcutaneous solution: 1 Unit(s) if Glucose 151 - 200  2 Unit(s) if Glucose 201 - 250  3 Unit(s) if Glucose 251 - 300  4 Unit(s) if Glucose 301 - 350  5 Unit(s) if Glucose 351 - 400  6 Unit(s) if Glucose Greater Than 400  HumaLOG 100 units/mL subcutaneous solution: subcutaneous once a day (at bedtime)  0 Unit(s) if Glucose 0 - 250  1 Unit(s) if Glucose 251 - 300  2 Unit(s) if Glucose 301 - 350  3 Unit(s) if Glucose 351 - 400  4 Unit(s) if Glucose Greater Than 400  HumaLOG 100 units/mL subcutaneous solution: 2 unit(s) subcutaneous 3 times a day (before meals)  Lacri-Lube S.O.P. ophthalmic ointment: 1 application to each affected eye 4 times a day  lactulose: 30 milliliter(s) orally 3 times a week HD days for constipation  Lantus 100 units/mL subcutaneous solution: 8 unit(s) subcutaneous once a day (at bedtime)  lidocaine-prilocaine 2.5%-2.5% topical cream: Apply topically to affected area once L arm m,w,f at night  midodrine 5 mg oral tablet: 1 tab(s) orally 3 times a week prior to HD PRN  Plavix 75 mg oral tablet: 1 tab(s) orally once a day  Protonix 40 mg oral delayed release tablet: 1 tab(s) orally once a day  Refresh ophthalmic solution: 1 drop(s) to each affected eye 2 times a day  Vitamin B12 1000 mcg oral tablet: 1 tab(s) orally once a day no

## 2020-03-06 NOTE — PROGRESS NOTE ADULT - ASSESSMENT
64y Male NH resident with HTN, DM, CAD s/p CABG ESRD on dialysis TTS, seizure disorder, BIBEMS from outpatient vascular office, for unresponsiveness after angioplasty. +Flu. Renal following fro ESRD Mx.     labs, chart reviewed

## 2020-03-06 NOTE — PROGRESS NOTE ADULT - SUBJECTIVE AND OBJECTIVE BOX
CHIEF COMPLAINT:    SUBJECTIVE:     [ ] All other systems negative  [ ] Unable to assess ROS because ________    OBJECTIVE:  ICU Vital Signs Last 24 Hrs  T(C): 36.9 (06 Mar 2020 06:19), Max: 37.3 (05 Mar 2020 14:23)  T(F): 98.5 (06 Mar 2020 06:19), Max: 99.1 (05 Mar 2020 14:23)  HR: 80 (06 Mar 2020 06:19) (80 - 88)  BP: 118/20 (06 Mar 2020 06:19) (118/20 - 145/79)  BP(mean): 79 (05 Mar 2020 12:00) (69 - 81)  ABP: --  ABP(mean): --  RR: 18 (06 Mar 2020 06:19) (18 - 26)  SpO2: 98% (06 Mar 2020 06:19) (91% - 100%)        03-05 @ 07:01  -  03-06 @ 07:00  --------------------------------------------------------  IN: 918 mL / OUT: 1200 mL / NET: -282 mL      CAPILLARY BLOOD GLUCOSE      POCT Blood Glucose.: 350 mg/dL (05 Mar 2020 22:50)      PHYSICAL EXAM:  General:   HEENT:   Lymph Nodes:  Neck:   Respiratory:   Cardiovascular:   Abdomen:   Extremities:   Skin:   Neurological:  Psychiatry:    HOSPITAL MEDICATIONS:  MEDICATIONS  (STANDING):  aspirin  chewable 81 milliGRAM(s) Oral daily  chlorhexidine 4% Liquid 1 Application(s) Topical <User Schedule>  clopidogrel Tablet 75 milliGRAM(s) Oral daily  dextrose 50% Injectable 12.5 Gram(s) IV Push once  dextrose 50% Injectable 25 Gram(s) IV Push once  gabapentin 300 milliGRAM(s) Oral daily  heparin  Injectable 5000 Unit(s) SubCutaneous every 8 hours  insulin lispro (HumaLOG) corrective regimen sliding scale   SubCutaneous three times a day before meals  insulin lispro (HumaLOG) corrective regimen sliding scale   SubCutaneous at bedtime  oseltamivir Suspension 30 milliGRAM(s) Oral daily  pantoprazole  Injectable 40 milliGRAM(s) IV Push daily  piperacillin/tazobactam IVPB.. 3.375 Gram(s) IV Intermittent every 12 hours    MEDICATIONS  (PRN):      LABS:                        11.6   10.68 )-----------( 134      ( 06 Mar 2020 05:40 )             38.0     03-06    131<L>  |  89<L>  |  46<H>  ----------------------------<  390<H>  5.3   |  24  |  7.07<H>    Ca    8.5      06 Mar 2020 05:40  Phos  5.4     03-05  Mg     2.1     03-05    TPro  8.4<H>  /  Alb  3.5  /  TBili  0.6  /  DBili  x   /  AST  18  /  ALT  19  /  AlkPhos  140<H>  03-05          Venous Blood Gas:  03-05 @ 03:35  7.26/61/37/22/58.4  VBG Lactate: 1.8      MICROBIOLOGY:     RADIOLOGY:  [ ] Reviewed and interpreted by me    PULMONARY FUNCTION TESTS:    EKG: CHIEF COMPLAINT: Patient is a 64y old  Male who presents with a chief complaint of Unresponsiveness (05 Mar 2020 11:31)    SUBJECTIVE:   No interval events overnight. Seen by bedside with delirium. Denies fever, chills, chest pain and SOB.   [ x Unable to assess completed ROS as confused.     OBJECTIVE:  ICU Vital Signs Last 24 Hrs  T(C): 36.9 (06 Mar 2020 06:19), Max: 37.3 (05 Mar 2020 14:23)  T(F): 98.5 (06 Mar 2020 06:19), Max: 99.1 (05 Mar 2020 14:23)  HR: 80 (06 Mar 2020 06:19) (80 - 88)  BP: 118/20 (06 Mar 2020 06:19) (118/20 - 145/79)  BP(mean): 79 (05 Mar 2020 12:00) (69 - 81)  ABP: --  ABP(mean): --  RR: 18 (06 Mar 2020 06:19) (18 - 26)  SpO2: 98% (06 Mar 2020 06:19) (91% - 100%)    03-05 @ 07:01  -  03-06 @ 07:00  --------------------------------------------------------  IN: 918 mL / OUT: 1200 mL / NET: -282 mL    CAPILLARY BLOOD GLUCOSE  POCT Blood Glucose.: 350 mg/dL (05 Mar 2020 22:50)    PHYSICAL EXAM:  General: NAD   Cards: S1/S2, no murmurs   Pulm: CTA bilaterally. No wheezes.   Abdomen: Soft, NTND. BS (+)   Extremities: No pedal edema.   Neurology: AOx1 (person). Patient awake and alert, but confused.     HOSPITAL MEDICATIONS:  MEDICATIONS  (STANDING):  aspirin  chewable 81 milliGRAM(s) Oral daily  chlorhexidine 4% Liquid 1 Application(s) Topical <User Schedule>  clopidogrel Tablet 75 milliGRAM(s) Oral daily  dextrose 50% Injectable 12.5 Gram(s) IV Push once  dextrose 50% Injectable 25 Gram(s) IV Push once  gabapentin 300 milliGRAM(s) Oral daily  heparin  Injectable 5000 Unit(s) SubCutaneous every 8 hours  insulin lispro (HumaLOG) corrective regimen sliding scale   SubCutaneous three times a day before meals  insulin lispro (HumaLOG) corrective regimen sliding scale   SubCutaneous at bedtime  oseltamivir Suspension 30 milliGRAM(s) Oral daily  pantoprazole  Injectable 40 milliGRAM(s) IV Push daily  piperacillin/tazobactam IVPB.. 3.375 Gram(s) IV Intermittent every 12 hours    MEDICATIONS  (PRN):    LABS:                        11.6   10.68 )-----------( 134      ( 06 Mar 2020 05:40 )             38.0     03-06    131<L>  |  89<L>  |  46<H>  ----------------------------<  390<H>  5.3   |  24  |  7.07<H>    Ca    8.5      06 Mar 2020 05:40  Phos  5.4     03-05  Mg     2.1     03-05    TPro  8.4<H>  /  Alb  3.5  /  TBili  0.6  /  DBili  x   /  AST  18  /  ALT  19  /  AlkPhos  140<H>  03-05    Venous Blood Gas:  03-05 @ 03:35  7.26/61/37/22/58.4  VBG Lactate: 1.8    MICROBIOLOGY:     RADIOLOGY:  [ ] Reviewed and interpreted by me    PULMONARY FUNCTION TESTS:    EKG:

## 2020-03-06 NOTE — PHYSICAL THERAPY INITIAL EVALUATION ADULT - PERTINENT HX OF CURRENT PROBLEM, REHAB EVAL
Patient is a 64 year old male admitted to [facility] on 3/2 by ambulance from an outpatient vascular office. Patient was having a procedure performed on his left forearm AV fistula. PMH HTN, DM, ESRD on dialysis, seizure disorder.

## 2020-03-06 NOTE — PROGRESS NOTE ADULT - ASSESSMENT
65 YO M with PMHx of HTN, DM2 A1C 7.5 (3/2020), ESRD on HD, GERD and Neuropathy who presented from Vascular office with AMS during during L AVF procedure likely 2/2 Acute Hypercarbic Respiratory Failure. Patient received Fentanyl and Versed and then became Bradypneic. He was administered Narcan, Flumazenil, and Dextrose IM by office staff without improvement, brought to ED and subsequently intubated and admitted to MICU. Now extubated and transferred to RCU. 65 YO M with PMHx of HTN, DM2 A1C 7.5 (3/2020), ESRD on HD, GERD and Neuropathy who presented from Vascular office with AMS during during L AVF procedure likely 2/2 Acute Hypercarbic Respiratory Failure. Patient received Fentanyl and Versed and then became Bradypneic. He was administered Narcan, Flumazenil, and Dextrose IM by office staff without improvement, brought to ED and subsequently intubated and admitted to MICU. Now extubated and transferred to RCU.     # AMS/ Metabolic Encephalopathy 2/2 Influenza vs Superimposed HFlu PNA   - CTH negative.   - BCX NTD. RVP Influenza AH1. SCx with H. Influenzae.   - Completed Tamilfu on 3/6. Zosyn (3/4) switched to CTX through 3/11  - Monitor mentation and fever curve.     # Acute Hypercarbic Respiratory Failure from Sedatives vs Influenza AH1   - Patient s/p sedative in Vascular clinic with AMS. s/p Narcan and Flumazenil and Dextrose without improvement thus, Intubated on 3/2 and Extubated 3/4 to NC.   - Currently doing well on NC. Wean SpO2 as tolerated.   - Suction and Chest PT PRN.     # Dysphagia post Extubation    - s/p SS and Soft diet restarted.   - Monitor tolerance and continue aspiration precautions.     # ESRD on HD TTHS   # Hypotension with ESRD   - Continue on HD and avoid nephrotoxins.   - Monitor renal function. Renal following.   - Currently holding Midodrine 5 on HD days and monitor BP and restart as necessary.     # DM2 A1C 7.5  - Patient initially with low FS and s/p Dextrose outpatient.   - Outpatient medications reviewed and patient on Lantus 20U QHS. Lantus held during admission and FSs improved but now trending up.   - ISS continued. Lispro 2 U AC and Lantus 8 U QHS added.   - Monitor FS and adjust insulin as indicated.     # Neuropathy   - Continue on Gabapentin.     # GERD   - Continue on Protonix.     # DVT PPX with HSQ 63 YO M with PMHx of CVA with residual weakness, HTN, DM2 A1C 7.5 (3/2020), Hyperlipidemia, CAD on DAPT, s/p ? CABG, ESRD on HD, GERD and Neuropathy who presented from Vascular office with AMS during during L AVF procedure likely 2/2 Acute Hypercarbic Respiratory Failure. Patient received Fentanyl and Versed and then became Bradypneic. He was administered Narcan, Flumazenil, and Dextrose IM by office staff without improvement, brought to ED and subsequently intubated and admitted to MICU. Now extubated and transferred to RCU.     # AMS/ Metabolic Encephalopathy 2/2 Influenza vs Superimposed HFlu PNA   - CTH negative.   - BCX NTD. RVP Influenza AH1. SCx with H. Influenzae.   - Completed Tamilfu on 3/6. Zosyn (3/4) switched to CTX through 3/11  - Monitor mentation and fever curve.     # Acute Hypercarbic Respiratory Failure from Sedatives vs Influenza AH1   - Patient s/p sedative in Vascular clinic with AMS. s/p Narcan and Flumazenil and Dextrose without improvement thus, Intubated on 3/2 and Extubated 3/4 to NC.   - Currently doing well on NC. Wean SpO2 as tolerated.   - Suction and Chest PT PRN.     # Dysphagia post Extubation    - s/p SS and Soft diet restarted.   - Monitor tolerance and continue aspiration precautions.     # ESRD on HD TTHS   # Hypotension with ESRD   - Continue on HD and avoid nephrotoxins.   - Monitor renal function. Renal following.   - Currently holding Midodrine 5 on HD days and monitor BP and restart as necessary.     # DM2 A1C 7.5  - Patient initially with low FS and s/p Dextrose outpatient.   - Outpatient medications reviewed and patient on Lantus 20U QHS. Lantus held during admission and FSs improved but now trending up.   - ISS continued. Lispro 2 U AC and Lantus 8 U QHS added.   - Monitor FS and adjust insulin as indicated.     # Neuropathy   - Continue on Gabapentin.     # GERD   - Continue on Protonix.     # Hypertension   # Hyperlipidemia   # CAD s/p ? CABG   - Case discussed with Daughter and noted with ? CABG Hx. Continue on CABG.     # Hx of CVA with Residual Weakness   - Supportive care.   - PT pending.     # DVT PPX with HSQ 65 YO M  from NH with PMHx of CVA with residual weakness, HTN, DM2 A1C 7.5 (3/2020), Hyperlipidemia, CAD on DAPT, s/p ? CABG, ESRD on HD, GERD and Neuropathy who presented from Vascular office with AMS during during L AVF procedure likely 2/2 Acute Hypercarbic Respiratory Failure. Patient received Fentanyl and Versed and then became Bradypneic. He was administered Narcan, Flumazenil, and Dextrose IM by office staff without improvement, brought to ED and subsequently intubated and admitted to MICU. Now extubated and transferred to RCU.     # AMS/ Metabolic Encephalopathy 2/2 Influenza vs Superimposed HFlu PNA   - CTH negative.   - BCX NTD. RVP Influenza AH1. SCx with H. Influenzae.   - Completed [place]fu on 3/6. Zosyn (3/4) switched to CTX through 3/11  - Monitor mentation and fever curve.     # Acute Hypercarbic Respiratory Failure from Sedatives vs Influenza AH1   - Patient s/p sedative in Vascular clinic with AMS. s/p Narcan and Flumazenil and Dextrose without improvement thus, Intubated on 3/2 and Extubated 3/4 to NC.   - Currently doing well on NC. Wean SpO2 as tolerated.   - Suction and Chest PT PRN.     # Dysphagia post Extubation    - s/p SS and Soft diet restarted.   - Monitor tolerance and continue aspiration precautions.     # ESRD on HD TTHS   # Hypotension with ESRD   - Continue on HD and avoid nephrotoxins.   - Monitor renal function. Renal following.   - Currently holding Midodrine 5 on HD days and monitor BP and restart as necessary.     # DM2 A1C 7.5  - Patient initially with low FS and s/p Dextrose outpatient.   - Outpatient medications reviewed and patient on Lantus 20U QHS. Lantus held during admission and FSs improved but now trending up.   - ISS continued. Lispro 2 U AC and Lantus 8 U QHS added.   - Monitor FS and adjust insulin as indicated.     # Neuropathy   - Continue on Gabapentin.     # GERD   - Continue on Protonix.     # Hypertension   # Hyperlipidemia   # CAD s/p ? CABG   - Case discussed with Daughter and noted with ? CABG Hx. Continue on CABG.   - Coreg 25mg PO BID outpatient held as BP soft currently.     # Hx of CVA with Residual Weakness   - Supportive care.   - PT pending.     # DVT PPX with HSQ

## 2020-03-06 NOTE — DISCHARGE NOTE PROVIDER - HOSPITAL COURSE
Mr. Morton is a 65 YO M with PMHx of HTN, DM2 A1C 7.5 (3/2020), ESRD on HD, GERD and Neuropathy who presented from Vascular office with AMS during during L AVF procedure likely 2/2 Acute Hypercarbic Respiratory Failure. Patient received Fentanyl and Versed and then became Bradypneic. He was administered Narcan, Flumazenil, and Dextrose IM by office staff without improvement, brought to ED and subsequently intubated and admitted to MICU. 64 yom PMH HTN, DM, ESRD on dialysis, wheelchair bound, presents to the ED by ambulance from an outpatient vascular office. Patient was having a procedure performed on his left forearm AV fistula. He received 50 mcg Fentanyl, 2 mg Versed, and subsequently became unresponsive and bradypneic. Patient administered Narcan, Flumazenil, and Dextrose IM by office staff without improvement.    In ED: patient given additional 2 mg Narcan without improvement. Patient subsequently intubated for airway protection and concern of hypercarbic respiratory failure. Vitals notable for temp of 93.4*F. CT head performed in ED, was unremarkable. Patient transferred to ICU for continued care on 3/2.         Patient weaned off sedation and extubated overnight on 3/4. Zosyn started for presumed aspiration PNA given doubling of white blood cell count and coarse upper airway breath sounds after extubation. Sputum culture growing Hemophilus influenza. Complicated by some encephalopathy thought to be metabolic/infectious (patient A&O x 2 currently, not at his baseline). Patient passed bedside swallow eval overnight into 3/5 and was resumed on soft diet and oral meds (aspirin, plavix, gabapentin, tamiflu).     Pt was tx to RCU on 3/6, mental status improved, pt was weaned off oxygen and tolerating.    Blood cultures negative.    Completed course of abx for h.flu pneumonia.    Optimized for discharge back to NH facility.    Continued maintenance HD without incident.    Cleared for DC on 3/10 by Dr. Hardy.            dispo: NH 64 yom PMH HTN, DM, ESRD on dialysis, wheelchair bound, presents to the ED by ambulance from an outpatient vascular office. Patient was having a procedure performed on his left forearm AV fistula. He received 50 mcg Fentanyl, 2 mg Versed, and subsequently became unresponsive and bradypneic. Patient administered Narcan, Flumazenil, and Dextrose IM by office staff without improvement.    In ED: patient given additional 2 mg Narcan without improvement. Patient subsequently intubated for airway protection and concern of hypercarbic respiratory failure. Vitals notable for temp of 93.4*F. CT head performed in ED, was unremarkable. Patient transferred to ICU for continued care on 3/2.         Flu A positive with sputum culture growing h flu.    Extubated 3/4.    Completed course of abx - transitioned to oral to complete 10 days total.    Pt was tx to RCU on 3/6, mental status improved, pt was weaned off oxygen and tolerating.    Blood cultures negative.    Completed course of abx.    Optimized for discharge back to NH facility.    Continued maintenance HD without incident.    Cleared for DC on 3/10 by Dr. Hardy.            dispo: NH

## 2020-03-06 NOTE — PROGRESS NOTE ADULT - SUBJECTIVE AND OBJECTIVE BOX
[facility] - Ember / Bon S /Senait/ MINH Hernadez/ MINH Garcia/ Virgilio Harper / GEORGINA Njeru  ---------------------------------------------------------------------------------------------------------------    Patient seen and examined bedside    Subjective and Objective: No overnight events, sob resolved. No complaints today. feeling better    Allergies: No Known Allergies      Hospital Medications:   MEDICATIONS  (STANDING):  aspirin enteric coated 81 milliGRAM(s) Oral daily  cefTRIAXone   IVPB 1000 milliGRAM(s) IV Intermittent every 24 hours  chlorhexidine 4% Liquid 1 Application(s) Topical <User Schedule>  clopidogrel Tablet 75 milliGRAM(s) Oral daily  cyanocobalamin 1000 MICROGram(s) Oral daily  dextrose 50% Injectable 12.5 Gram(s) IV Push once  dextrose 50% Injectable 25 Gram(s) IV Push once  ergocalciferol 73191 Unit(s) Oral <User Schedule>  folic acid 1 milliGRAM(s) Oral daily  gabapentin 300 milliGRAM(s) Oral daily  heparin  Injectable 5000 Unit(s) SubCutaneous every 8 hours  insulin glargine Injectable (LANTUS) 8 Unit(s) SubCutaneous at bedtime  insulin lispro (HumaLOG) corrective regimen sliding scale   SubCutaneous three times a day before meals  insulin lispro (HumaLOG) corrective regimen sliding scale   SubCutaneous at bedtime  insulin lispro Injectable (HumaLOG) 2 Unit(s) SubCutaneous three times a day before meals  pantoprazole    Tablet 40 milliGRAM(s) Oral before breakfast      REVIEW OF SYSTEMS:  CONSTITUTIONAL: No weakness, fevers or chills  EYES/ENT: No visual changes;  No vertigo or throat pain   NECK: No pain or stiffness  RESPIRATORY: No cough, wheezing, hemoptysis; No shortness of breath  CARDIOVASCULAR: No chest pain or palpitations.  GASTROINTESTINAL: No abdominal or epigastric pain. No nausea, vomiting, or hematemesis; No diarrhea or constipation. No melena or hematochezia.  GENITOURINARY: No dysuria, frequency, foamy urine, urinary urgency, incontinence or hematuria  NEUROLOGICAL: No numbness or weakness  SKIN: No itching, burning, rashes, or lesions   VASCULAR: No bilateral lower extremity edema.   All other review of systems is negative unless indicated above.    VITALS:  T(F): 98.4 (03-06-20 @ 14:22), Max: 98.5 (03-06-20 @ 06:19)  HR: 77 (03-06-20 @ 14:22)  BP: 115/60 (03-06-20 @ 14:22)  RR: 17 (03-06-20 @ 14:22)  SpO2: 98% (03-06-20 @ 14:22)  Wt(kg): --    03-05 @ 07:01  -  03-06 @ 07:00  --------------------------------------------------------  IN: 918 mL / OUT: 1200 mL / NET: -282 mL          PHYSICAL EXAM:  Constitutional: NAD  HEENT: anicteric sclera, oropharynx clear  Neck: No JVD  Respiratory: CTAB, no wheezes, rales or rhonchi  Cardiovascular: S1, S2, RRR  Gastrointestinal: BS+, soft, NT/ND  Extremities: No cyanosis or clubbing. No peripheral edema  Neurological: A/O x 3, no focal deficits  Psychiatric: Normal mood, normal affect  : No CVA tenderness. No de luna.   Vascular Access:    LABS:  03-06    131<L>  |  89<L>  |  46<H>  ----------------------------<  390<H>  5.3   |  24  |  7.07<H>    Ca    8.5      06 Mar 2020 05:40  Phos  5.4     03-05  Mg     2.1     03-05    TPro  8.4<H>  /  Alb  3.5  /  TBili  0.6  /  DBili      /  AST  18  /  ALT  19  /  AlkPhos  140<H>  03-05    Creatinine Trend: 7.07 <--, 8.98 <--, 7.37 <--, 9.23 <--, 8.66 <--, 8.39 <--, 8.28 <--                        11.6   10.68 )-----------( 134      ( 06 Mar 2020 05:40 )             38.0     Urine Studies:        RADIOLOGY & ADDITIONAL STUDIES: [facility] - Ember / Bon WILKINSON /Senait/ MINH Hernadez/ MINH Garcia/ Virgilio Harper / GEORGINA Njeru  ---------------------------------------------------------------------------------------------------------------    Patient seen and examined bedside    Subjective and Objective: No overnight events, denied sob. No complaints today. sleeping, arousable, appropriately answering q's    Allergies: No Known Allergies      Hospital Medications:   MEDICATIONS  (STANDING):  aspirin enteric coated 81 milliGRAM(s) Oral daily  cefTRIAXone   IVPB 1000 milliGRAM(s) IV Intermittent every 24 hours  chlorhexidine 4% Liquid 1 Application(s) Topical <User Schedule>  clopidogrel Tablet 75 milliGRAM(s) Oral daily  cyanocobalamin 1000 MICROGram(s) Oral daily  dextrose 50% Injectable 12.5 Gram(s) IV Push once  dextrose 50% Injectable 25 Gram(s) IV Push once  ergocalciferol 56001 Unit(s) Oral <User Schedule>  folic acid 1 milliGRAM(s) Oral daily  gabapentin 300 milliGRAM(s) Oral daily  heparin  Injectable 5000 Unit(s) SubCutaneous every 8 hours  insulin glargine Injectable (LANTUS) 8 Unit(s) SubCutaneous at bedtime  insulin lispro (HumaLOG) corrective regimen sliding scale   SubCutaneous three times a day before meals  insulin lispro (HumaLOG) corrective regimen sliding scale   SubCutaneous at bedtime  insulin lispro Injectable (HumaLOG) 2 Unit(s) SubCutaneous three times a day before meals  pantoprazole    Tablet 40 milliGRAM(s) Oral before breakfast      VITALS:  T(F): 98.4 (03-06-20 @ 14:22), Max: 98.5 (03-06-20 @ 06:19)  HR: 77 (03-06-20 @ 14:22)  BP: 115/60 (03-06-20 @ 14:22)  RR: 17 (03-06-20 @ 14:22)  SpO2: 98% (03-06-20 @ 14:22)  Wt(kg): --    03-05 @ 07:01  -  03-06 @ 07:00  --------------------------------------------------------  IN: 918 mL / OUT: 1200 mL / NET: -282 mL      PHYSICAL EXAM:  Constitutional: NAD  HEENT: anicteric sclera, oropharynx clear  Neck: No JVD  Respiratory: CTAB, no wheezes, rales or rhonchi  Cardiovascular: S1, S2, RRR  Gastrointestinal: BS+, soft, NT/ND  Extremities: No cyanosis or clubbing. No peripheral edema  Neurological: A/O x 2  : No de luna.   Vascular Access: LUE AVF+thrill    LABS:  03-06    131<L>  |  89<L>  |  46<H>  ----------------------------<  390<H>  5.3   |  24  |  7.07<H>    Ca    8.5      06 Mar 2020 05:40  Phos  5.4     03-05  Mg     2.1     03-05    TPro  8.4<H>  /  Alb  3.5  /  TBili  0.6  /  DBili      /  AST  18  /  ALT  19  /  AlkPhos  140<H>  03-05    Creatinine Trend: 7.07 <--, 8.98 <--, 7.37 <--, 9.23 <--, 8.66 <--, 8.39 <--, 8.28 <--                        11.6   10.68 )-----------( 134      ( 06 Mar 2020 05:40 )             38.0     Urine Studies:        RADIOLOGY & ADDITIONAL STUDIES:

## 2020-03-06 NOTE — PHYSICAL THERAPY INITIAL EVALUATION ADULT - ADDITIONAL COMMENTS
Patient is a poor historian. Patient states he lives at a Nursing Home. Patient reports he walks with a walker but also uses a wheelchair. Pt states "they help me there."    Patient was left semi-supine in bed as found, all lines/tubes intact and call de la torre within reach, RN Sasha barriga

## 2020-03-07 LAB
ANION GAP SERPL CALC-SCNC: 19 MMO/L — HIGH (ref 7–14)
BACTERIA BLD CULT: SIGNIFICANT CHANGE UP
BUN SERPL-MCNC: 72 MG/DL — HIGH (ref 7–23)
CALCIUM SERPL-MCNC: 8.5 MG/DL — SIGNIFICANT CHANGE UP (ref 8.4–10.5)
CHLORIDE SERPL-SCNC: 92 MMOL/L — LOW (ref 98–107)
CO2 SERPL-SCNC: 25 MMOL/L — SIGNIFICANT CHANGE UP (ref 22–31)
CREAT SERPL-MCNC: 9.78 MG/DL — HIGH (ref 0.5–1.3)
CULTURE RESULTS: SIGNIFICANT CHANGE UP
GLUCOSE BLDC GLUCOMTR-MCNC: 156 MG/DL — HIGH (ref 70–99)
GLUCOSE BLDC GLUCOMTR-MCNC: 166 MG/DL — HIGH (ref 70–99)
GLUCOSE BLDC GLUCOMTR-MCNC: 181 MG/DL — HIGH (ref 70–99)
GLUCOSE BLDC GLUCOMTR-MCNC: 82 MG/DL — SIGNIFICANT CHANGE UP (ref 70–99)
GLUCOSE SERPL-MCNC: 186 MG/DL — HIGH (ref 70–99)
HCT VFR BLD CALC: 39.2 % — SIGNIFICANT CHANGE UP (ref 39–50)
HGB BLD-MCNC: 11.4 G/DL — LOW (ref 13–17)
MAGNESIUM SERPL-MCNC: 2.2 MG/DL — SIGNIFICANT CHANGE UP (ref 1.6–2.6)
MCHC RBC-ENTMCNC: 29.1 % — LOW (ref 32–36)
MCHC RBC-ENTMCNC: 29.3 PG — SIGNIFICANT CHANGE UP (ref 27–34)
MCV RBC AUTO: 100.8 FL — HIGH (ref 80–100)
NRBC # FLD: 0.07 K/UL — SIGNIFICANT CHANGE UP (ref 0–0)
PHOSPHATE SERPL-MCNC: 7.4 MG/DL — HIGH (ref 2.5–4.5)
PLATELET # BLD AUTO: 156 K/UL — SIGNIFICANT CHANGE UP (ref 150–400)
PMV BLD: 10.3 FL — SIGNIFICANT CHANGE UP (ref 7–13)
POTASSIUM SERPL-MCNC: 5.1 MMOL/L — SIGNIFICANT CHANGE UP (ref 3.5–5.3)
POTASSIUM SERPL-SCNC: 5.1 MMOL/L — SIGNIFICANT CHANGE UP (ref 3.5–5.3)
RBC # BLD: 3.89 M/UL — LOW (ref 4.2–5.8)
RBC # FLD: 15.7 % — HIGH (ref 10.3–14.5)
SODIUM SERPL-SCNC: 136 MMOL/L — SIGNIFICANT CHANGE UP (ref 135–145)
SPECIMEN SOURCE: SIGNIFICANT CHANGE UP
WBC # BLD: 11.68 K/UL — HIGH (ref 3.8–10.5)
WBC # FLD AUTO: 11.68 K/UL — HIGH (ref 3.8–10.5)

## 2020-03-07 PROCEDURE — 99232 SBSQ HOSP IP/OBS MODERATE 35: CPT | Mod: GC

## 2020-03-07 RX ADMIN — INSULIN GLARGINE 8 UNIT(S): 100 INJECTION, SOLUTION SUBCUTANEOUS at 21:17

## 2020-03-07 RX ADMIN — PREGABALIN 1000 MICROGRAM(S): 225 CAPSULE ORAL at 12:06

## 2020-03-07 RX ADMIN — Medication 1 MILLIGRAM(S): at 12:07

## 2020-03-07 RX ADMIN — Medication 2 UNIT(S): at 08:37

## 2020-03-07 RX ADMIN — HEPARIN SODIUM 5000 UNIT(S): 5000 INJECTION INTRAVENOUS; SUBCUTANEOUS at 21:16

## 2020-03-07 RX ADMIN — CLOPIDOGREL BISULFATE 75 MILLIGRAM(S): 75 TABLET, FILM COATED ORAL at 12:06

## 2020-03-07 RX ADMIN — HEPARIN SODIUM 5000 UNIT(S): 5000 INJECTION INTRAVENOUS; SUBCUTANEOUS at 05:20

## 2020-03-07 RX ADMIN — Medication 2 UNIT(S): at 12:07

## 2020-03-07 RX ADMIN — CHLORHEXIDINE GLUCONATE 1 APPLICATION(S): 213 SOLUTION TOPICAL at 05:21

## 2020-03-07 RX ADMIN — GABAPENTIN 300 MILLIGRAM(S): 400 CAPSULE ORAL at 12:06

## 2020-03-07 RX ADMIN — Medication 1: at 08:38

## 2020-03-07 RX ADMIN — Medication 81 MILLIGRAM(S): at 12:07

## 2020-03-07 RX ADMIN — PANTOPRAZOLE SODIUM 40 MILLIGRAM(S): 20 TABLET, DELAYED RELEASE ORAL at 05:20

## 2020-03-07 RX ADMIN — CEFTRIAXONE 100 MILLIGRAM(S): 500 INJECTION, POWDER, FOR SOLUTION INTRAMUSCULAR; INTRAVENOUS at 10:32

## 2020-03-07 RX ADMIN — Medication 1: at 12:07

## 2020-03-07 NOTE — PROGRESS NOTE ADULT - SUBJECTIVE AND OBJECTIVE BOX
CHIEF COMPLAINT:    SUBJECTIVE:     [ ] All other systems negative  [ ] Unable to assess ROS because ________    OBJECTIVE:  ICU Vital Signs Last 24 Hrs  T(C): 36.3 (07 Mar 2020 05:00), Max: 36.9 (06 Mar 2020 14:22)  T(F): 97.4 (07 Mar 2020 05:00), Max: 98.4 (06 Mar 2020 14:22)  HR: 68 (07 Mar 2020 05:00) (64 - 77)  BP: 109/49 (07 Mar 2020 05:00) (102/54 - 115/60)  BP(mean): --  ABP: --  ABP(mean): --  RR: 18 (07 Mar 2020 05:00) (17 - 18)  SpO2: 96% (07 Mar 2020 05:00) (95% - 98%)        CAPILLARY BLOOD GLUCOSE      POCT Blood Glucose.: 152 mg/dL (06 Mar 2020 21:07)      PHYSICAL EXAM:  General:   HEENT:   Lymph Nodes:  Neck:   Respiratory:   Cardiovascular:   Abdomen:   Extremities:   Skin:   Neurological:  Psychiatry:    HOSPITAL MEDICATIONS:  MEDICATIONS  (STANDING):  aspirin enteric coated 81 milliGRAM(s) Oral daily  cefTRIAXone   IVPB 1000 milliGRAM(s) IV Intermittent every 24 hours  chlorhexidine 4% Liquid 1 Application(s) Topical <User Schedule>  clopidogrel Tablet 75 milliGRAM(s) Oral daily  cyanocobalamin 1000 MICROGram(s) Oral daily  dextrose 50% Injectable 12.5 Gram(s) IV Push once  dextrose 50% Injectable 25 Gram(s) IV Push once  ergocalciferol 74337 Unit(s) Oral <User Schedule>  folic acid 1 milliGRAM(s) Oral daily  gabapentin 300 milliGRAM(s) Oral daily  heparin  Injectable 5000 Unit(s) SubCutaneous every 8 hours  insulin glargine Injectable (LANTUS) 8 Unit(s) SubCutaneous at bedtime  insulin lispro (HumaLOG) corrective regimen sliding scale   SubCutaneous three times a day before meals  insulin lispro (HumaLOG) corrective regimen sliding scale   SubCutaneous at bedtime  insulin lispro Injectable (HumaLOG) 2 Unit(s) SubCutaneous three times a day before meals  pantoprazole    Tablet 40 milliGRAM(s) Oral before breakfast    MEDICATIONS  (PRN):      LABS:                        11.6   10.68 )-----------( 134      ( 06 Mar 2020 05:40 )             38.0     03-06    131<L>  |  89<L>  |  46<H>  ----------------------------<  390<H>  5.3   |  24  |  7.07<H>    Ca    8.5      06 Mar 2020 05:40                MICROBIOLOGY:     RADIOLOGY:  [ ] Reviewed and interpreted by me    PULMONARY FUNCTION TESTS:    EKG: CHIEF COMPLAINT: Patient is a 64y old  Male who presents with a chief complaint of Unresponsiveness (05 Mar 2020 11:31)    SUBJECTIVE:   No interval events overnight. Seen by bedside with delirium. Denies fever, chills, chest pain and SOB.   [ x Unable to assess completed ROS as confused.     OBJECTIVE:  ICU Vital Signs Last 24 Hrs  T(C): 36.3 (07 Mar 2020 05:00), Max: 36.9 (06 Mar 2020 14:22)  T(F): 97.4 (07 Mar 2020 05:00), Max: 98.4 (06 Mar 2020 14:22)  HR: 68 (07 Mar 2020 05:00) (64 - 77)  BP: 109/49 (07 Mar 2020 05:00) (102/54 - 115/60)  BP(mean): --  ABP: --  ABP(mean): --  RR: 18 (07 Mar 2020 05:00) (17 - 18)  SpO2: 96% (07 Mar 2020 05:00) (95% - 98%)    CAPILLARY BLOOD GLUCOSE  POCT Blood Glucose.: 152 mg/dL (06 Mar 2020 21:07)    PHYSICAL EXAM:  General: NAD   Cards: S1/S2, no murmurs   Pulm: CTA bilaterally. No wheezes.   Abdomen: Soft, NTND. BS (+)   Extremities: No pedal edema.   Neurology: AOx1 (person). Patient awake and alert, but confused.     HOSPITAL MEDICATIONS:  MEDICATIONS  (STANDING):  aspirin enteric coated 81 milliGRAM(s) Oral daily  cefTRIAXone   IVPB 1000 milliGRAM(s) IV Intermittent every 24 hours  chlorhexidine 4% Liquid 1 Application(s) Topical <User Schedule>  clopidogrel Tablet 75 milliGRAM(s) Oral daily  cyanocobalamin 1000 MICROGram(s) Oral daily  dextrose 50% Injectable 12.5 Gram(s) IV Push once  dextrose 50% Injectable 25 Gram(s) IV Push once  ergocalciferol 98411 Unit(s) Oral <User Schedule>  folic acid 1 milliGRAM(s) Oral daily  gabapentin 300 milliGRAM(s) Oral daily  heparin  Injectable 5000 Unit(s) SubCutaneous every 8 hours  insulin glargine Injectable (LANTUS) 8 Unit(s) SubCutaneous at bedtime  insulin lispro (HumaLOG) corrective regimen sliding scale   SubCutaneous three times a day before meals  insulin lispro (HumaLOG) corrective regimen sliding scale   SubCutaneous at bedtime  insulin lispro Injectable (HumaLOG) 2 Unit(s) SubCutaneous three times a day before meals  pantoprazole    Tablet 40 milliGRAM(s) Oral before breakfast    MEDICATIONS  (PRN):    LABS:                        11.6   10.68 )-----------( 134      ( 06 Mar 2020 05:40 )             38.0     03-06    131<L>  |  89<L>  |  46<H>  ----------------------------<  390<H>  5.3   |  24  |  7.07<H>    Ca    8.5      06 Mar 2020 05:40    MICROBIOLOGY:     RADIOLOGY:  [ ] Reviewed and interpreted by me    PULMONARY FUNCTION TESTS:    EKG:

## 2020-03-07 NOTE — PROGRESS NOTE ADULT - ASSESSMENT
65 YO M  from NH with PMHx of CVA with residual weakness, HTN, DM2 A1C 7.5 (3/2020), Hyperlipidemia, CAD on DAPT, s/p ? CABG, ESRD on HD, GERD and Neuropathy who presented from Vascular office with AMS during during L AVF procedure likely 2/2 Acute Hypercarbic Respiratory Failure. Patient received Fentanyl and Versed and then became Bradypneic. He was administered Narcan, Flumazenil, and Dextrose IM by office staff without improvement, brought to ED and subsequently intubated and admitted to MICU. Now extubated and transferred to RCU.     # AMS/ Metabolic Encephalopathy 2/2 Influenza vs Superimposed HFlu PNA   - CTH negative.   - BCX NTD. RVP Influenza AH1. SCx with H. Influenzae.   - Completed Tamilfu on 3/6. Zosyn (3/4) switched to CTX through 3/11  - Monitor mentation and fever curve.     # Acute Hypercarbic Respiratory Failure from Sedatives vs Influenza AH1   - Patient s/p sedative in Vascular clinic with AMS. s/p Narcan and Flumazenil and Dextrose without improvement thus, Intubated on 3/2 and Extubated 3/4 to NC.   - Currently doing well on NC. Wean SpO2 as tolerated.   - Suction and Chest PT PRN.     # Dysphagia post Extubation    - s/p SS and Soft diet restarted.   - Monitor tolerance and continue aspiration precautions.     # ESRD on HD TTHS   # Hypotension with ESRD   - Continue on HD and avoid nephrotoxins.   - Monitor renal function. Renal following.   - Currently holding Midodrine 5 on HD days and monitor BP and restart as necessary.     # DM2 A1C 7.5  - Patient initially with low FS and s/p Dextrose outpatient.   - Outpatient medications reviewed and patient on Lantus 20U QHS. Lantus held during admission and FSs improved but now trending up.   - ISS continued. Lispro 2 U AC and Lantus 8 U QHS added.   - Monitor FS and adjust insulin as indicated.     # Neuropathy   - Continue on Gabapentin.     # GERD   - Continue on Protonix.     # Hypertension   # Hyperlipidemia   # CAD s/p ? CABG   - Case discussed with Daughter and noted with ? CABG Hx. Continue on CABG.   - Coreg 25mg PO BID outpatient held as BP soft currently.     # Hx of CVA with Residual Weakness   - Supportive care.   - PT pending.     # DVT PPX with HSQ

## 2020-03-07 NOTE — PROGRESS NOTE ADULT - ASSESSMENT
64y Male NH resident with HTN, DM, CAD s/p CABG ESRD on dialysis TTS, seizure disorder, BIBEMS from outpatient vascular office, for unresponsiveness after angioplasty. +Flu. Renal following fro ESRD Mx.     labs, chart reviewed    RVP Influenza AH1. SCx with H. Influenzae.   - Completed Tamilfu on 3/6. Zosyn (3/4) switched to CTX through 3/11  dose all meds/abxs for ESRD  DM-Mx per primary team

## 2020-03-07 NOTE — PROGRESS NOTE ADULT - SUBJECTIVE AND OBJECTIVE BOX
[facility] - Ember / Bon WILKINSON /Senait/ MINH Hernadez/ MINH Garcia/ Virgilio Harper / GEORGINA Njeru  ---------------------------------------------------------------------------------------------------------------    Patient seen and examined bedside    Subjective and Objective: No overnight events, denied sob. No complaints today    Allergies: No Known Allergies      Hospital Medications:   MEDICATIONS  (STANDING):  aspirin enteric coated 81 milliGRAM(s) Oral daily  cefTRIAXone   IVPB 1000 milliGRAM(s) IV Intermittent every 24 hours  chlorhexidine 4% Liquid 1 Application(s) Topical <User Schedule>  clopidogrel Tablet 75 milliGRAM(s) Oral daily  cyanocobalamin 1000 MICROGram(s) Oral daily  dextrose 50% Injectable 12.5 Gram(s) IV Push once  dextrose 50% Injectable 25 Gram(s) IV Push once  ergocalciferol 54463 Unit(s) Oral <User Schedule>  folic acid 1 milliGRAM(s) Oral daily  gabapentin 300 milliGRAM(s) Oral daily  heparin  Injectable 5000 Unit(s) SubCutaneous every 8 hours  insulin glargine Injectable (LANTUS) 8 Unit(s) SubCutaneous at bedtime  insulin lispro (HumaLOG) corrective regimen sliding scale   SubCutaneous three times a day before meals  insulin lispro (HumaLOG) corrective regimen sliding scale   SubCutaneous at bedtime  insulin lispro Injectable (HumaLOG) 2 Unit(s) SubCutaneous three times a day before meals  pantoprazole    Tablet 40 milliGRAM(s) Oral before breakfast      VITALS:  T(F): 97.9 (03-07-20 @ 12:35), Max: 97.9 (03-07-20 @ 12:35)  HR: 67 (03-07-20 @ 12:35)  BP: 101/59 (03-07-20 @ 12:35)  RR: 16 (03-07-20 @ 12:35)  SpO2: 96% (03-07-20 @ 05:00)  Wt(kg): --        PHYSICAL EXAM:  Constitutional: NAD  HEENT: anicteric sclera, oropharynx clear  Neck: No JVD  Respiratory: CTAB, no wheezes, rales or rhonchi  Cardiovascular: S1, S2, RRR  Gastrointestinal: BS+, soft, NT/ND  Extremities: No cyanosis or clubbing. No peripheral edema  Neurological: A/O x 1, confused  : No de luna.   Vascular Access: AVF+thrill    LABS:  03-07    136  |  92<L>  |  72<H>  ----------------------------<  186<H>  5.1   |  25  |  9.78<H>    Ca    8.5      07 Mar 2020 12:35  Phos  7.4     03-07  Mg     2.2     03-07      Creatinine Trend: 9.78 <--, 7.07 <--, 8.98 <--, 7.37 <--, 9.23 <--, 8.66 <--, 8.39 <--, 8.28 <--                        11.4   11.68 )-----------( 156      ( 07 Mar 2020 12:35 )             39.2     Urine Studies:        RADIOLOGY & ADDITIONAL STUDIES:

## 2020-03-08 LAB
GLUCOSE BLDC GLUCOMTR-MCNC: 125 MG/DL — HIGH (ref 70–99)
GLUCOSE BLDC GLUCOMTR-MCNC: 208 MG/DL — HIGH (ref 70–99)
GLUCOSE BLDC GLUCOMTR-MCNC: 216 MG/DL — HIGH (ref 70–99)
GLUCOSE BLDC GLUCOMTR-MCNC: 236 MG/DL — HIGH (ref 70–99)

## 2020-03-08 PROCEDURE — 99232 SBSQ HOSP IP/OBS MODERATE 35: CPT | Mod: GC

## 2020-03-08 RX ADMIN — PANTOPRAZOLE SODIUM 40 MILLIGRAM(S): 20 TABLET, DELAYED RELEASE ORAL at 05:22

## 2020-03-08 RX ADMIN — HEPARIN SODIUM 5000 UNIT(S): 5000 INJECTION INTRAVENOUS; SUBCUTANEOUS at 22:09

## 2020-03-08 RX ADMIN — Medication 2 UNIT(S): at 17:06

## 2020-03-08 RX ADMIN — PREGABALIN 1000 MICROGRAM(S): 225 CAPSULE ORAL at 11:36

## 2020-03-08 RX ADMIN — INSULIN GLARGINE 8 UNIT(S): 100 INJECTION, SOLUTION SUBCUTANEOUS at 22:20

## 2020-03-08 RX ADMIN — Medication 2: at 12:39

## 2020-03-08 RX ADMIN — CHLORHEXIDINE GLUCONATE 1 APPLICATION(S): 213 SOLUTION TOPICAL at 05:23

## 2020-03-08 RX ADMIN — Medication 2 UNIT(S): at 08:47

## 2020-03-08 RX ADMIN — HEPARIN SODIUM 5000 UNIT(S): 5000 INJECTION INTRAVENOUS; SUBCUTANEOUS at 14:13

## 2020-03-08 RX ADMIN — Medication 2 UNIT(S): at 12:39

## 2020-03-08 RX ADMIN — CEFTRIAXONE 100 MILLIGRAM(S): 500 INJECTION, POWDER, FOR SOLUTION INTRAMUSCULAR; INTRAVENOUS at 11:35

## 2020-03-08 RX ADMIN — Medication 2: at 17:06

## 2020-03-08 RX ADMIN — GABAPENTIN 300 MILLIGRAM(S): 400 CAPSULE ORAL at 11:36

## 2020-03-08 RX ADMIN — HEPARIN SODIUM 5000 UNIT(S): 5000 INJECTION INTRAVENOUS; SUBCUTANEOUS at 05:22

## 2020-03-08 RX ADMIN — Medication 1 MILLIGRAM(S): at 11:36

## 2020-03-08 RX ADMIN — Medication 81 MILLIGRAM(S): at 11:36

## 2020-03-08 RX ADMIN — CLOPIDOGREL BISULFATE 75 MILLIGRAM(S): 75 TABLET, FILM COATED ORAL at 11:35

## 2020-03-08 RX ADMIN — Medication 2: at 08:47

## 2020-03-08 NOTE — PROGRESS NOTE ADULT - ASSESSMENT
63 YO M  from NH with PMHx of CVA with residual weakness, HTN, DM2 A1C 7.5 (3/2020), Hyperlipidemia, CAD on DAPT, s/p ? CABG, ESRD on HD, GERD and Neuropathy who presented from Vascular office with AMS during during L AVF procedure likely 2/2 Acute Hypercarbic Respiratory Failure. Patient received Fentanyl and Versed and then became Bradypneic. He was administered Narcan, Flumazenil, and Dextrose IM by office staff without improvement, brought to ED and subsequently intubated and admitted to MICU. Now extubated and transferred to RCU.     # AMS/ Metabolic Encephalopathy 2/2 Influenza vs Superimposed HFlu PNA   - CTH negative.   - BCX NTD. RVP Influenza AH1. SCx with H. Influenzae.   - Completed Tamilfu on 3/6. Zosyn (3/4) switched to CTX through 3/11  - Monitor mentation and fever curve.     # Acute Hypercarbic Respiratory Failure from Sedatives vs Influenza AH1   - Patient s/p sedative in Vascular clinic with AMS. s/p Narcan and Flumazenil and Dextrose without improvement thus, Intubated on 3/2 and Extubated 3/4 to NC.   - Currently doing well on NC. Wean SpO2 as tolerated.   - Suction and Chest PT PRN.     # Dysphagia post Extubation    - s/p SS and Soft diet restarted.   - Monitor tolerance and continue aspiration precautions.     # ESRD on HD TTHS   # Hypotension with ESRD   - Continue on HD and avoid nephrotoxins.   - Monitor renal function. Renal following.   - Currently holding Midodrine 5 on HD days and monitor BP and restart as necessary.     # DM2 A1C 7.5  - Patient initially with low FS and s/p Dextrose outpatient.   - Outpatient medications reviewed and patient on Lantus 20U QHS. Lantus held during admission and FSs improved but now trending up.   - ISS continued. Lispro 2 U AC and Lantus 8 U QHS added.   - Monitor FS and adjust insulin as indicated.     # Neuropathy   - Continue on Gabapentin.     # GERD   - Continue on Protonix.     # Hypertension   # Hyperlipidemia   # CAD s/p ? CABG   - Case discussed with Daughter and noted with ? CABG Hx. Continue on CABG.   - Coreg 25mg PO BID outpatient held as BP soft currently.     # Hx of CVA with Residual Weakness   - Supportive care.   - PT pending.     # DVT PPX with HSQ     Sophie Diamond, ANP-c  Department of Medicine/[facility]  Pager #[phone]

## 2020-03-08 NOTE — PROGRESS NOTE ADULT - SUBJECTIVE AND OBJECTIVE BOX
CHIEF COMPLAINT: Patient is a 64y old  Male who presents with a chief complaint of Unresponsiveness (05 Mar 2020 11:31)      Interval Events: none     REVIEW OF SYSTEMS:  Constitutional:   Eyes:  ENT:  CV:  Resp:  GI:  :  MSK:  Integumentary:  Neurological:  Psychiatric:  Endocrine:  Hematologic/Lymphatic:  Allergic/Immunologic:  [ ] All other systems negative      OBJECTIVE:  ICU Vital Signs Last 24 Hrs  T(C): 36.4 (08 Mar 2020 05:20), Max: 36.6 (07 Mar 2020 12:35)  T(F): 97.6 (08 Mar 2020 05:20), Max: 97.9 (07 Mar 2020 12:35)  HR: 75 (08 Mar 2020 05:20) (67 - 75)  BP: 113/59 (08 Mar 2020 05:20) (101/59 - 118/62)  BP(mean): --  ABP: --  ABP(mean): --  RR: 19 (08 Mar 2020 05:20) (16 - 19)  SpO2: 100% (08 Mar 2020 05:20) (97% - 100%)        03-07 @ 06:01  -  03-08 @ 06:50  --------------------------------------------------------  IN: 500 mL / OUT: 1000 mL / NET: -500 mL      CAPILLARY BLOOD GLUCOSE      POCT Blood Glucose.: 166 mg/dL (07 Mar 2020 21:14)      HOSPITAL MEDICATIONS:  MEDICATIONS  (STANDING):  aspirin enteric coated 81 milliGRAM(s) Oral daily  cefTRIAXone   IVPB 1000 milliGRAM(s) IV Intermittent every 24 hours  chlorhexidine 4% Liquid 1 Application(s) Topical <User Schedule>  clopidogrel Tablet 75 milliGRAM(s) Oral daily  cyanocobalamin 1000 MICROGram(s) Oral daily  dextrose 50% Injectable 12.5 Gram(s) IV Push once  dextrose 50% Injectable 25 Gram(s) IV Push once  ergocalciferol 14727 Unit(s) Oral <User Schedule>  folic acid 1 milliGRAM(s) Oral daily  gabapentin 300 milliGRAM(s) Oral daily  heparin  Injectable 5000 Unit(s) SubCutaneous every 8 hours  insulin glargine Injectable (LANTUS) 8 Unit(s) SubCutaneous at bedtime  insulin lispro (HumaLOG) corrective regimen sliding scale   SubCutaneous three times a day before meals  insulin lispro (HumaLOG) corrective regimen sliding scale   SubCutaneous at bedtime  insulin lispro Injectable (HumaLOG) 2 Unit(s) SubCutaneous three times a day before meals  pantoprazole    Tablet 40 milliGRAM(s) Oral before breakfast    MEDICATIONS  (PRN):      LABS:                        11.4   11.68 )-----------( 156      ( 07 Mar 2020 12:35 )             39.2     03-07    136  |  92<L>  |  72<H>  ----------------------------<  186<H>  5.1   |  25  |  9.78<H>    Ca    8.5      07 Mar 2020 12:35  Phos  7.4     03-07  Mg     2.2     03-07                MICROBIOLOGY:     RADIOLOGY:  [ ] Reviewed and interpreted by me    PULMONARY FUNCTION TESTS:    EKG: CHIEF COMPLAINT: Patient is a 64y old  Male who presents with a chief complaint of Unresponsiveness (05 Mar 2020 11:31)      Interval Events: none     REVIEW OF SYSTEMS:  Constitutional: denies pain   Difficult to obtain pt is confused oriented x 1   [ ] All other systems negative      OBJECTIVE:  ICU Vital Signs Last 24 Hrs  T(C): 36.4 (08 Mar 2020 05:20), Max: 36.6 (07 Mar 2020 12:35)  T(F): 97.6 (08 Mar 2020 05:20), Max: 97.9 (07 Mar 2020 12:35)  HR: 75 (08 Mar 2020 05:20) (67 - 75)  BP: 113/59 (08 Mar 2020 05:20) (101/59 - 118/62)  BP(mean): --  ABP: --  ABP(mean): --  RR: 19 (08 Mar 2020 05:20) (16 - 19)  SpO2: 100% (08 Mar 2020 05:20) (97% - 100%)        03-07 @ 06:01  -  03-08 @ 06:50  --------------------------------------------------------  IN: 500 mL / OUT: 1000 mL / NET: -500 mL      CAPILLARY BLOOD GLUCOSE      POCT Blood Glucose.: 166 mg/dL (07 Mar 2020 21:14)      HOSPITAL MEDICATIONS:  MEDICATIONS  (STANDING):  aspirin enteric coated 81 milliGRAM(s) Oral daily  cefTRIAXone   IVPB 1000 milliGRAM(s) IV Intermittent every 24 hours  chlorhexidine 4% Liquid 1 Application(s) Topical <User Schedule>  clopidogrel Tablet 75 milliGRAM(s) Oral daily  cyanocobalamin 1000 MICROGram(s) Oral daily  dextrose 50% Injectable 12.5 Gram(s) IV Push once  dextrose 50% Injectable 25 Gram(s) IV Push once  ergocalciferol 45271 Unit(s) Oral <User Schedule>  folic acid 1 milliGRAM(s) Oral daily  gabapentin 300 milliGRAM(s) Oral daily  heparin  Injectable 5000 Unit(s) SubCutaneous every 8 hours  insulin glargine Injectable (LANTUS) 8 Unit(s) SubCutaneous at bedtime  insulin lispro (HumaLOG) corrective regimen sliding scale   SubCutaneous three times a day before meals  insulin lispro (HumaLOG) corrective regimen sliding scale   SubCutaneous at bedtime  insulin lispro Injectable (HumaLOG) 2 Unit(s) SubCutaneous three times a day before meals  pantoprazole    Tablet 40 milliGRAM(s) Oral before breakfast    MEDICATIONS  (PRN):      LABS:                        11.4   11.68 )-----------( 156      ( 07 Mar 2020 12:35 )             39.2     03-07    136  |  92<L>  |  72<H>  ----------------------------<  186<H>  5.1   |  25  |  9.78<H>    Ca    8.5      07 Mar 2020 12:35  Phos  7.4     03-07  Mg     2.2     03-07                MICROBIOLOGY:     RADIOLOGY:  [ ] Reviewed and interpreted by me    PULMONARY FUNCTION TESTS:    EKG:

## 2020-03-09 LAB
GLUCOSE BLDC GLUCOMTR-MCNC: 103 MG/DL — HIGH (ref 70–99)
GLUCOSE BLDC GLUCOMTR-MCNC: 120 MG/DL — HIGH (ref 70–99)
GLUCOSE BLDC GLUCOMTR-MCNC: 154 MG/DL — HIGH (ref 70–99)
GLUCOSE BLDC GLUCOMTR-MCNC: 236 MG/DL — HIGH (ref 70–99)

## 2020-03-09 PROCEDURE — 99232 SBSQ HOSP IP/OBS MODERATE 35: CPT | Mod: GC

## 2020-03-09 RX ADMIN — Medication 1 MILLIGRAM(S): at 11:00

## 2020-03-09 RX ADMIN — INSULIN GLARGINE 8 UNIT(S): 100 INJECTION, SOLUTION SUBCUTANEOUS at 21:50

## 2020-03-09 RX ADMIN — PREGABALIN 1000 MICROGRAM(S): 225 CAPSULE ORAL at 11:00

## 2020-03-09 RX ADMIN — Medication 2 UNIT(S): at 11:34

## 2020-03-09 RX ADMIN — Medication 81 MILLIGRAM(S): at 11:00

## 2020-03-09 RX ADMIN — CEFTRIAXONE 100 MILLIGRAM(S): 500 INJECTION, POWDER, FOR SOLUTION INTRAMUSCULAR; INTRAVENOUS at 10:59

## 2020-03-09 RX ADMIN — GABAPENTIN 300 MILLIGRAM(S): 400 CAPSULE ORAL at 11:00

## 2020-03-09 RX ADMIN — PANTOPRAZOLE SODIUM 40 MILLIGRAM(S): 20 TABLET, DELAYED RELEASE ORAL at 06:34

## 2020-03-09 RX ADMIN — CLOPIDOGREL BISULFATE 75 MILLIGRAM(S): 75 TABLET, FILM COATED ORAL at 11:00

## 2020-03-09 RX ADMIN — Medication 2 UNIT(S): at 08:04

## 2020-03-09 RX ADMIN — CHLORHEXIDINE GLUCONATE 1 APPLICATION(S): 213 SOLUTION TOPICAL at 06:34

## 2020-03-09 RX ADMIN — HEPARIN SODIUM 5000 UNIT(S): 5000 INJECTION INTRAVENOUS; SUBCUTANEOUS at 13:08

## 2020-03-09 RX ADMIN — Medication 2 UNIT(S): at 16:40

## 2020-03-09 RX ADMIN — Medication 1: at 16:40

## 2020-03-09 RX ADMIN — HEPARIN SODIUM 5000 UNIT(S): 5000 INJECTION INTRAVENOUS; SUBCUTANEOUS at 06:34

## 2020-03-09 RX ADMIN — HEPARIN SODIUM 5000 UNIT(S): 5000 INJECTION INTRAVENOUS; SUBCUTANEOUS at 21:49

## 2020-03-09 NOTE — PROGRESS NOTE ADULT - SUBJECTIVE AND OBJECTIVE BOX
CHIEF COMPLAINT: Patient is a 64y old  Male who presents with a chief complaint of Unresponsiveness (05 Mar 2020 11:31)      Interval Events: none overnight     REVIEW OF SYSTEMS:  Constitutional:   Eyes:  ENT:  CV:  Resp:  GI:  :  MSK:  Integumentary:  Neurological:  Psychiatric:  Endocrine:  Hematologic/Lymphatic:  Allergic/Immunologic:  [ ] All other systems negative  [ ] Unable to assess ROS because ________    OBJECTIVE:  ICU Vital Signs Last 24 Hrs  T(C): 36.5 (09 Mar 2020 06:00), Max: 36.5 (09 Mar 2020 06:00)  T(F): 97.7 (09 Mar 2020 06:00), Max: 97.7 (09 Mar 2020 06:00)  HR: 76 (09 Mar 2020 06:00) (73 - 78)  BP: 137/64 (09 Mar 2020 06:00) (128/88 - 137/64)  BP(mean): --  ABP: --  ABP(mean): --  RR: 20 (09 Mar 2020 06:00) (18 - 20)  SpO2: 100% (09 Mar 2020 06:00) (95% - 100%)        03-08 @ 07:01  -  03-09 @ 07:00  --------------------------------------------------------  IN: 0 mL / OUT: 0 mL / NET: 0 mL      CAPILLARY BLOOD GLUCOSE      POCT Blood Glucose.: 125 mg/dL (08 Mar 2020 21:22)        HOSPITAL MEDICATIONS:  MEDICATIONS  (STANDING):  aspirin enteric coated 81 milliGRAM(s) Oral daily  cefTRIAXone   IVPB 1000 milliGRAM(s) IV Intermittent every 24 hours  chlorhexidine 4% Liquid 1 Application(s) Topical <User Schedule>  clopidogrel Tablet 75 milliGRAM(s) Oral daily  cyanocobalamin 1000 MICROGram(s) Oral daily  dextrose 50% Injectable 12.5 Gram(s) IV Push once  dextrose 50% Injectable 25 Gram(s) IV Push once  ergocalciferol 12489 Unit(s) Oral <User Schedule>  folic acid 1 milliGRAM(s) Oral daily  gabapentin 300 milliGRAM(s) Oral daily  heparin  Injectable 5000 Unit(s) SubCutaneous every 8 hours  insulin glargine Injectable (LANTUS) 8 Unit(s) SubCutaneous at bedtime  insulin lispro (HumaLOG) corrective regimen sliding scale   SubCutaneous three times a day before meals  insulin lispro (HumaLOG) corrective regimen sliding scale   SubCutaneous at bedtime  insulin lispro Injectable (HumaLOG) 2 Unit(s) SubCutaneous three times a day before meals  pantoprazole    Tablet 40 milliGRAM(s) Oral before breakfast    MEDICATIONS  (PRN):      LABS:                        11.4   11.68 )-----------( 156      ( 07 Mar 2020 12:35 )             39.2     03-07    136  |  92<L>  |  72<H>  ----------------------------<  186<H>  5.1   |  25  |  9.78<H>    Ca    8.5      07 Mar 2020 12:35  Phos  7.4     03-07  Mg     2.2     03-07                MICROBIOLOGY:     RADIOLOGY:  [ ] Reviewed and interpreted by me    PULMONARY FUNCTION TESTS:    EKG: CHIEF COMPLAINT: Patient is a 64y old  Male who presents with a chief complaint of Unresponsiveness (05 Mar 2020 11:31)      Interval Events: none overnight     REVIEW OF SYSTEMS:  Constitutional: feeling better :  CV: Denies   Resp: Denies   GI: Denies   [x ] All other systems negative    OBJECTIVE:  ICU Vital Signs Last 24 Hrs  T(C): 36.5 (09 Mar 2020 06:00), Max: 36.5 (09 Mar 2020 06:00)  T(F): 97.7 (09 Mar 2020 06:00), Max: 97.7 (09 Mar 2020 06:00)  HR: 76 (09 Mar 2020 06:00) (73 - 78)  BP: 137/64 (09 Mar 2020 06:00) (128/88 - 137/64)  BP(mean): --  ABP: --  ABP(mean): --  RR: 20 (09 Mar 2020 06:00) (18 - 20)  SpO2: 100% (09 Mar 2020 06:00) (95% - 100%)        03-08 @ 07:01  -  03-09 @ 07:00  --------------------------------------------------------  IN: 0 mL / OUT: 0 mL / NET: 0 mL      CAPILLARY BLOOD GLUCOSE      POCT Blood Glucose.: 125 mg/dL (08 Mar 2020 21:22)        HOSPITAL MEDICATIONS:  MEDICATIONS  (STANDING):  aspirin enteric coated 81 milliGRAM(s) Oral daily  cefTRIAXone   IVPB 1000 milliGRAM(s) IV Intermittent every 24 hours  chlorhexidine 4% Liquid 1 Application(s) Topical <User Schedule>  clopidogrel Tablet 75 milliGRAM(s) Oral daily  cyanocobalamin 1000 MICROGram(s) Oral daily  dextrose 50% Injectable 12.5 Gram(s) IV Push once  dextrose 50% Injectable 25 Gram(s) IV Push once  ergocalciferol 16582 Unit(s) Oral <User Schedule>  folic acid 1 milliGRAM(s) Oral daily  gabapentin 300 milliGRAM(s) Oral daily  heparin  Injectable 5000 Unit(s) SubCutaneous every 8 hours  insulin glargine Injectable (LANTUS) 8 Unit(s) SubCutaneous at bedtime  insulin lispro (HumaLOG) corrective regimen sliding scale   SubCutaneous three times a day before meals  insulin lispro (HumaLOG) corrective regimen sliding scale   SubCutaneous at bedtime  insulin lispro Injectable (HumaLOG) 2 Unit(s) SubCutaneous three times a day before meals  pantoprazole    Tablet 40 milliGRAM(s) Oral before breakfast    MEDICATIONS  (PRN):      LABS:                        11.4   11.68 )-----------( 156      ( 07 Mar 2020 12:35 )             39.2     03-07    136  |  92<L>  |  72<H>  ----------------------------<  186<H>  5.1   |  25  |  9.78<H>    Ca    8.5      07 Mar 2020 12:35  Phos  7.4     03-07  Mg     2.2     03-07                MICROBIOLOGY:     RADIOLOGY:  [ ] Reviewed and interpreted by me    PULMONARY FUNCTION TESTS:    EKG:

## 2020-03-09 NOTE — PROGRESS NOTE ADULT - ASSESSMENT
64y Male NH resident with HTN, DM, CAD s/p CABG ESRD on dialysis TTS, seizure disorder, BIBEMS from outpatient vascular office, for unresponsiveness after angioplasty. +Flu. Renal following fro ESRD Mx.     no new labs,   chart reviewed    RVP Influenza AH1. SCx with H. Influenzae. - Completed Tamilfu on 3/6.   was on Zosyn (3/4) switched to CTX, now on Amoxicillin  dose all meds/abxs for ESRD  DM-Mx per primary team

## 2020-03-09 NOTE — PROGRESS NOTE ADULT - SUBJECTIVE AND OBJECTIVE BOX
[facility] - Ember / Bon WILKINSON /Senait/ MINH Hernadez/ MINH Garcia/ Virgilio Harper / GEORGINA Njeru  ---------------------------------------------------------------------------------------------------------------    Patient seen and examined bedside    Subjective and Objective: No overnight events, denied sob. No complaints today.     Allergies: No Known Allergies      Hospital Medications:   MEDICATIONS  (STANDING):  amoxicillin  500 milliGRAM(s)/clavulanate 1 Tablet(s) Oral <User Schedule>  aspirin enteric coated 81 milliGRAM(s) Oral daily  chlorhexidine 4% Liquid 1 Application(s) Topical <User Schedule>  clopidogrel Tablet 75 milliGRAM(s) Oral daily  cyanocobalamin 1000 MICROGram(s) Oral daily  dextrose 50% Injectable 12.5 Gram(s) IV Push once  dextrose 50% Injectable 25 Gram(s) IV Push once  ergocalciferol 27652 Unit(s) Oral <User Schedule>  folic acid 1 milliGRAM(s) Oral daily  gabapentin 300 milliGRAM(s) Oral daily  heparin  Injectable 5000 Unit(s) SubCutaneous every 8 hours  insulin glargine Injectable (LANTUS) 8 Unit(s) SubCutaneous at bedtime  insulin lispro (HumaLOG) corrective regimen sliding scale   SubCutaneous three times a day before meals  insulin lispro (HumaLOG) corrective regimen sliding scale   SubCutaneous at bedtime  insulin lispro Injectable (HumaLOG) 2 Unit(s) SubCutaneous three times a day before meals  pantoprazole    Tablet 40 milliGRAM(s) Oral before breakfast      VITALS:  T(F): 97.8 (03-09-20 @ 13:02), Max: 97.8 (03-09-20 @ 13:02)  HR: 88 (03-09-20 @ 13:02)  BP: 114/61 (03-09-20 @ 13:02)  RR: 18 (03-09-20 @ 13:02)  SpO2: 97% (03-09-20 @ 13:02)  Wt(kg): --    03-08 @ 07:01  -  03-09 @ 07:00  --------------------------------------------------------  IN: 0 mL / OUT: 0 mL / NET: 0 mL      PHYSICAL EXAM:  Constitutional: NAD  HEENT: anicteric sclera, oropharynx clear  Neck: No JVD  Respiratory: CTAB, no wheezes, rales or rhonchi  Cardiovascular: S1, S2, RRR  Gastrointestinal: BS+, soft, NT/ND  Extremities: No cyanosis or clubbing. No peripheral edema  Neurological: A/O x 3, no focal deficits  Psychiatric: Normal mood, normal affect  : No de luna.   Vascular Access: AVF+thrill    LABS:        Creatinine Trend: 9.78 <--, 7.07 <--, 8.98 <--, 7.37 <--, 9.23 <--, 8.66 <--, 8.39 <--    Urine Studies:        RADIOLOGY & ADDITIONAL STUDIES:

## 2020-03-09 NOTE — PROGRESS NOTE ADULT - ASSESSMENT
63 YO M  from NH with PMHx of CVA with residual weakness, HTN, DM2 A1C 7.5 (3/2020), Hyperlipidemia, CAD on DAPT, s/p ? CABG, ESRD on HD, GERD and Neuropathy who presented from Vascular office with AMS during during L AVF procedure likely 2/2 Acute Hypercarbic Respiratory Failure. Patient received Fentanyl and Versed and then became Bradypneic. He was administered Narcan, Flumazenil, and Dextrose IM by office staff without improvement, brought to ED and subsequently intubated and admitted to MICU. Now extubated and transferred to RCU.     # AMS/ Metabolic Encephalopathy 2/2 Influenza vs Superimposed HFlu PNA   - CTH negative.   - BCX NTD. RVP Influenza AH1. SCx with H. Influenzae.   - Completed Tamilfu on 3/6. Zosyn (3/4) switched to CTX through 3/11  - Monitor mentation and fever curve.     # Acute Hypercarbic Respiratory Failure from Sedatives vs Influenza AH1   - Patient s/p sedative in Vascular clinic with AMS. s/p Narcan and Flumazenil and Dextrose without improvement thus, Intubated on 3/2 and Extubated 3/4 to NC.   - Currently doing well on NC. Wean SpO2 as tolerated.   - Suction and Chest PT PRN.     # Dysphagia post Extubation    - s/p SS and Soft diet restarted.   - Monitor tolerance and continue aspiration precautions.     # ESRD on HD TTHS   # Hypotension with ESRD   - Continue on HD and avoid nephrotoxins.   - Monitor renal function. Renal following.   - Currently holding Midodrine 5 on HD days and monitor BP and restart as necessary.     # DM2 A1C 7.5  - Patient initially with low FS and s/p Dextrose outpatient.   - Outpatient medications reviewed and patient on Lantus 20U QHS. Lantus held during admission and FSs improved but now trending up.   - ISS continued. Lispro 2 U AC and Lantus 8 U QHS added.   - Monitor FS and adjust insulin as indicated.     # Neuropathy   - Continue on Gabapentin.     # GERD   - Continue on Protonix.     # Hypertension   # Hyperlipidemia   # CAD s/p ? CABG   - Case discussed with Daughter and noted with ? CABG Hx. Continue on CABG.   - Coreg 25mg PO BID outpatient held as BP soft currently.     # Hx of CVA with Residual Weakness   - Supportive care.   - PT pending.     # DVT PPX with HSQ     Sophie Diamond, ANP-c  Department of Medicine/[facility]  Pager #[phone] 63 YO M  from NH with PMHx of CVA with residual weakness, HTN, DM2 A1C 7.5 (3/2020), Hyperlipidemia, CAD on DAPT, s/p ? CABG, ESRD on HD, GERD and Neuropathy who presented from Vascular office with AMS during during L AVF procedure likely 2/2 Acute Hypercarbic Respiratory Failure. Patient received Fentanyl and Versed and then became Bradypneic. He was administered Narcan, Flumazenil, and Dextrose IM by office staff without improvement, brought to ED and subsequently intubated and admitted to MICU. Now extubated and transferred to RCU.     # AMS/ Metabolic Encephalopathy 2/2 Influenza vs Superimposed HFlu PNA   - CTH negative.   - BCX NTD. RVP Influenza AH1. SCx with H. Influenzae.   - Completed Tamilfu on 3/6. Zosyn (3/4) switched to CTX through 3/11 but changed to po augmentin renally dosed   - Monitor mentation and fever curve.     # Acute Hypercarbic Respiratory Failure from Sedatives vs Influenza AH1   - Patient s/p sedative in Vascular clinic with AMS. s/p Narcan and Flumazenil and Dextrose without improvement thus, Intubated on 3/2 and Extubated 3/4 to NC.   - Currently doing well on NC. Wean SpO2 as tolerated.   - Suction and Chest PT PRN.     # Dysphagia post Extubation    - s/p SS and Soft diet restarted.   - Monitor tolerance and continue aspiration precautions.     # ESRD on HD TTHS   # Hypotension with ESRD   - Continue on HD and avoid nephrotoxins.   - Monitor renal function. Renal following.   - Currently holding Midodrine 5 on HD days and monitor BP and restart as necessary.     # DM2 A1C 7.5  - Patient initially with low FS and s/p Dextrose outpatient.   - Outpatient medications reviewed and patient on Lantus 20U QHS. Lantus held during admission and FSs improved but now trending up.   - ISS continued. Lispro 2 U AC and Lantus 8 U QHS added.   - Monitor FS and adjust insulin as indicated.     # Neuropathy   - Continue on Gabapentin.     # GERD   - Continue on Protonix.     # Hypertension   # Hyperlipidemia   # CAD s/p ? CABG   - Case discussed with Daughter and noted with ? CABG Hx. Continue on CABG.   - Coreg 25mg PO BID outpatient held as BP soft currently.     # Hx of CVA with Residual Weakness   - Supportive care.   - PT recs rehab will begin dc planning back to NH    # DVT PPX with HSQ     Sophie Diamond, ANP-c  Department of Medicine/[facility]  Pager #[phone]

## 2020-03-10 ENCOUNTER — TRANSCRIPTION ENCOUNTER (OUTPATIENT)
Age: 65
End: 2020-03-10

## 2020-03-10 VITALS
HEART RATE: 77 BPM | SYSTOLIC BLOOD PRESSURE: 138 MMHG | DIASTOLIC BLOOD PRESSURE: 52 MMHG | OXYGEN SATURATION: 96 % | RESPIRATION RATE: 18 BRPM | TEMPERATURE: 98 F | WEIGHT: 231.26 LBS

## 2020-03-10 LAB
ANION GAP SERPL CALC-SCNC: 19 MMO/L — HIGH (ref 7–14)
BUN SERPL-MCNC: 73 MG/DL — HIGH (ref 7–23)
CALCIUM SERPL-MCNC: 9 MG/DL — SIGNIFICANT CHANGE UP (ref 8.4–10.5)
CHLORIDE SERPL-SCNC: 93 MMOL/L — LOW (ref 98–107)
CO2 SERPL-SCNC: 24 MMOL/L — SIGNIFICANT CHANGE UP (ref 22–31)
CREAT SERPL-MCNC: 11.02 MG/DL — HIGH (ref 0.5–1.3)
GLUCOSE BLDC GLUCOMTR-MCNC: 104 MG/DL — HIGH (ref 70–99)
GLUCOSE BLDC GLUCOMTR-MCNC: 115 MG/DL — HIGH (ref 70–99)
GLUCOSE BLDC GLUCOMTR-MCNC: 147 MG/DL — HIGH (ref 70–99)
GLUCOSE SERPL-MCNC: 225 MG/DL — HIGH (ref 70–99)
HCT VFR BLD CALC: 37 % — LOW (ref 39–50)
HGB BLD-MCNC: 11.2 G/DL — LOW (ref 13–17)
MAGNESIUM SERPL-MCNC: 2.1 MG/DL — SIGNIFICANT CHANGE UP (ref 1.6–2.6)
MCHC RBC-ENTMCNC: 29.4 PG — SIGNIFICANT CHANGE UP (ref 27–34)
MCHC RBC-ENTMCNC: 30.3 % — LOW (ref 32–36)
MCV RBC AUTO: 97.1 FL — SIGNIFICANT CHANGE UP (ref 80–100)
NRBC # FLD: 0 K/UL — SIGNIFICANT CHANGE UP (ref 0–0)
PHOSPHATE SERPL-MCNC: 5.7 MG/DL — HIGH (ref 2.5–4.5)
PLATELET # BLD AUTO: 199 K/UL — SIGNIFICANT CHANGE UP (ref 150–400)
PMV BLD: 9.9 FL — SIGNIFICANT CHANGE UP (ref 7–13)
POTASSIUM SERPL-MCNC: 4.3 MMOL/L — SIGNIFICANT CHANGE UP (ref 3.5–5.3)
POTASSIUM SERPL-SCNC: 4.3 MMOL/L — SIGNIFICANT CHANGE UP (ref 3.5–5.3)
RBC # BLD: 3.81 M/UL — LOW (ref 4.2–5.8)
RBC # FLD: 15.5 % — HIGH (ref 10.3–14.5)
SODIUM SERPL-SCNC: 136 MMOL/L — SIGNIFICANT CHANGE UP (ref 135–145)
WBC # BLD: 7.38 K/UL — SIGNIFICANT CHANGE UP (ref 3.8–10.5)
WBC # FLD AUTO: 7.38 K/UL — SIGNIFICANT CHANGE UP (ref 3.8–10.5)

## 2020-03-10 PROCEDURE — 99232 SBSQ HOSP IP/OBS MODERATE 35: CPT | Mod: GC

## 2020-03-10 RX ORDER — ACETAMINOPHEN 500 MG
2 TABLET ORAL
Qty: 0 | Refills: 0 | DISCHARGE

## 2020-03-10 RX ORDER — GABAPENTIN 400 MG/1
1 CAPSULE ORAL
Qty: 0 | Refills: 0 | DISCHARGE
Start: 2020-03-10

## 2020-03-10 RX ORDER — GABAPENTIN 400 MG/1
1 CAPSULE ORAL
Qty: 0 | Refills: 0 | DISCHARGE

## 2020-03-10 RX ORDER — INSULIN LISPRO 100/ML
0 VIAL (ML) SUBCUTANEOUS
Qty: 0 | Refills: 0 | DISCHARGE

## 2020-03-10 RX ORDER — INSULIN LISPRO 100/ML
2 VIAL (ML) SUBCUTANEOUS
Qty: 0 | Refills: 0 | DISCHARGE

## 2020-03-10 RX ORDER — INSULIN GLARGINE 100 [IU]/ML
8 INJECTION, SOLUTION SUBCUTANEOUS
Qty: 0 | Refills: 0 | DISCHARGE

## 2020-03-10 RX ORDER — INSULIN GLARGINE 100 [IU]/ML
20 INJECTION, SOLUTION SUBCUTANEOUS
Qty: 0 | Refills: 0 | DISCHARGE

## 2020-03-10 RX ADMIN — PANTOPRAZOLE SODIUM 40 MILLIGRAM(S): 20 TABLET, DELAYED RELEASE ORAL at 05:39

## 2020-03-10 RX ADMIN — HEPARIN SODIUM 5000 UNIT(S): 5000 INJECTION INTRAVENOUS; SUBCUTANEOUS at 05:39

## 2020-03-10 RX ADMIN — GABAPENTIN 300 MILLIGRAM(S): 400 CAPSULE ORAL at 11:54

## 2020-03-10 RX ADMIN — Medication 1 MILLIGRAM(S): at 11:54

## 2020-03-10 RX ADMIN — Medication 81 MILLIGRAM(S): at 11:54

## 2020-03-10 RX ADMIN — PREGABALIN 1000 MICROGRAM(S): 225 CAPSULE ORAL at 11:54

## 2020-03-10 RX ADMIN — CHLORHEXIDINE GLUCONATE 1 APPLICATION(S): 213 SOLUTION TOPICAL at 07:39

## 2020-03-10 RX ADMIN — CLOPIDOGREL BISULFATE 75 MILLIGRAM(S): 75 TABLET, FILM COATED ORAL at 11:54

## 2020-03-10 RX ADMIN — Medication 2 UNIT(S): at 08:15

## 2020-03-10 RX ADMIN — Medication 2 UNIT(S): at 11:54

## 2020-03-10 NOTE — DISCHARGE NOTE NURSING/CASE MANAGEMENT/SOCIAL WORK - NSDCPEEMAIL_GEN_ALL_CORE
[facility] for Tobacco Control email [email or web address]

## 2020-03-10 NOTE — DISCHARGE NOTE NURSING/CASE MANAGEMENT/SOCIAL WORK - NSDCPEWEB_GEN_ALL_CORE
[place] website --- [email or web address] for Tobacco [email or web address] website --- [email or web address]

## 2020-03-10 NOTE — PROGRESS NOTE ADULT - MENTAL STATUS
Awake alert oriented x 1
alert, oriented x2-3  follows commands, responds appropriately
awake, oriented to self , follows commands

## 2020-03-10 NOTE — PROGRESS NOTE ADULT - PROBLEM SELECTOR PLAN 1
Maintenance HD schedule TTS.  Notable thrill on AV access, dialyzed yesterday without acute events.   Flowsheet reviewed.   Repeat HD tomorrow.   Electrolytes within normal limits.  pressor management as per ICU team, chronic hypotension.
Maintenance HD schedule TTS.  Notable thrill on AV access, tolerating HD today.    Low UF goals due to hypotension.    Electrolytes within normal limits.  Mental status better today, but still not at baseline.
Maintenance HD schedule TTS.  s/p HD 3/5, Rx sheet reviewed, tolerated well. low uf 2/2 low bp  #ANEMIA: Hb > goal. no Epogen indicated  mild hyperkalemia K 5.3>5.1 better- c/w low K/renal diet  Low UF goals due to hypotension.    scheduled for rpt HD today w/2k bath, uf 1kg as tolearted
Maintenance HD schedule TTS.  s/p HD 3/7, Rx sheet reviewed, tolerated well. low uf 2/2 low bp  #ANEMIA: Hb > goal. no Epogen indicated  mild hyperkalemia K 5.3>5.1 better- c/w low K/renal diet  Low UF goals due to hypotension. BP stable today. not on anti HTN meds  scheduled for rpt HD tomorrow w/2k bath, uf 1kg as tolearted
Maintenance HD schedule TTS.  scheduled for rpt HD today w/2k bath, uf 1kg as tolerated  Chronic hypotension, on midodrine prehd   Hg at goal, no need for mike at this time.  Dispo planning
Maintenance HD schedule TTS.  s/p HD yesterday, Rx sheet reviewed, tolerated well. low uf 2/2 low bp  #ANEMIA: Hb > goal. no Epogen indicated  mild hyperkalemia K 5.3- c/w low K/renal diet  Low UF goals due to hypotension.    Mental status better today, but still not at baseline.  plan for rpt HD tomorrow AM w/2k bath, uf 1kg as tolearted

## 2020-03-10 NOTE — PROGRESS NOTE ADULT - RS GEN PE MLT RESP DETAILS PC
good air movement/airway patent
airway patent/breath sounds equal/clear to auscultation bilaterally
airway patent/breath sounds equal

## 2020-03-10 NOTE — PROGRESS NOTE ADULT - ASSESSMENT
65 YO M  from NH with PMHx of CVA with residual weakness, HTN, DM2 A1C 7.5 (3/2020), Hyperlipidemia, CAD on DAPT, s/p ? CABG, ESRD on HD, GERD and Neuropathy who presented from Vascular office with AMS during during L AVF procedure likely 2/2 Acute Hypercarbic Respiratory Failure. Patient received Fentanyl and Versed and then became Bradypneic. He was administered Narcan, Flumazenil, and Dextrose IM by office staff without improvement, brought to ED and subsequently intubated and admitted to MICU, found to have FLU with superimposed pneumonia.  Completed course of antibiotics.  Stable for discharge.

## 2020-03-10 NOTE — DISCHARGE NOTE NURSING/CASE MANAGEMENT/SOCIAL WORK - PATIENT PORTAL LINK FT
You can access the FollowMyHealth Patient Portal offered by [facility] by registering at the following website: [email or web address]. By joining [employer]’s FollowMyHealth portal, you will also be able to view your health information using other applications (apps) compatible with our system.

## 2020-03-10 NOTE — PROGRESS NOTE ADULT - SUBJECTIVE AND OBJECTIVE BOX
Nephrology Followup Note - [phone] - Dr Crockett / Dr Pa / Dr Garcia / Dr Sapp / Dr Hernadez / Dr Howell / Dr Harper / Dr Carey  Pt seen and examined at bedside  No acute events overnight. No complaints. Mental status at baseline     Allergies:  No Known Allergies    Hospital Medications:   MEDICATIONS  (STANDING):  amoxicillin  500 milliGRAM(s)/clavulanate 1 Tablet(s) Oral <User Schedule>  aspirin enteric coated 81 milliGRAM(s) Oral daily  chlorhexidine 4% Liquid 1 Application(s) Topical <User Schedule>  clopidogrel Tablet 75 milliGRAM(s) Oral daily  cyanocobalamin 1000 MICROGram(s) Oral daily  dextrose 50% Injectable 12.5 Gram(s) IV Push once  dextrose 50% Injectable 25 Gram(s) IV Push once  ergocalciferol 20586 Unit(s) Oral <User Schedule>  folic acid 1 milliGRAM(s) Oral daily  gabapentin 300 milliGRAM(s) Oral daily  heparin  Injectable 5000 Unit(s) SubCutaneous every 8 hours  insulin glargine Injectable (LANTUS) 8 Unit(s) SubCutaneous at bedtime  insulin lispro (HumaLOG) corrective regimen sliding scale   SubCutaneous three times a day before meals  insulin lispro (HumaLOG) corrective regimen sliding scale   SubCutaneous at bedtime  insulin lispro Injectable (HumaLOG) 2 Unit(s) SubCutaneous three times a day before meals  pantoprazole    Tablet 40 milliGRAM(s) Oral before breakfast    VITALS:  T(F): 98.9 (03-10-20 @ 11:50), Max: 98.9 (03-10-20 @ 11:50)  HR: 86 (03-10-20 @ 11:50)  BP: 139/56 (03-10-20 @ 11:50)  RR: 19 (03-10-20 @ 11:50)  SpO2: 93% (03-10-20 @ 11:50)  Wt(kg): --    03-09 @ 07:01  -  03-10 @ 07:00  --------------------------------------------------------  IN: 0 mL / OUT: 0 mL / NET: 0 mL        PHYSICAL EXAM:  Constitutional: NAD  HEENT: anicteric sclera, oropharynx clear, MMM  Neck: No JVD  Respiratory: CTAB, no wheezes, rales or rhonchi  Cardiovascular: S1, S2, RRR  Gastrointestinal: BS+, soft, NT/ND  Extremities: No cyanosis or clubbing. No peripheral edema  Neurological: A/O x 3, no focal deficits  Psychiatric: Normal mood, normal affect  : No CVA tenderness. No de luna.   Skin: No rashes  Vascular Access: LUE AV access +thrill and bruit.     LABS:        Creatinine Trend: 9.78 <--, 7.07 <--, 8.98 <--, 7.37 <--    Urine Studies:      RADIOLOGY & ADDITIONAL STUDIES:

## 2020-03-10 NOTE — PROGRESS NOTE ADULT - SUBJECTIVE AND OBJECTIVE BOX
CHIEF COMPLAINT: Patient is a 64y old  Male who presents with a chief complaint of Unresponsiveness (05 Mar 2020 11:31)    Interval Events:      REVIEW OF SYSTEMS:  Constitutional:   Eyes:  ENT:  CV:  Resp:  GI:  :  MSK:  Integumentary:  Neurological:  Psychiatric:  Endocrine:  Hematologic/Lymphatic:  Allergic/Immunologic:  [ ] All other systems negative  [ ] Unable to assess ROS because ________      OBJECTIVE:  ICU Vital Signs Last 24 Hrs  T(C): 36.8 (10 Mar 2020 05:00), Max: 37 (09 Mar 2020 21:00)  T(F): 98.2 (10 Mar 2020 05:00), Max: 98.6 (09 Mar 2020 21:00)  HR: 81 (10 Mar 2020 05:00) (81 - 89)  BP: 148/52 (10 Mar 2020 05:00) (114/61 - 148/52)  BP(mean): --  ABP: --  ABP(mean): --  RR: 20 (10 Mar 2020 05:00) (18 - 20)  SpO2: 90% (10 Mar 2020 05:00) (90% - 97%)    POCT Blood Glucose.: 236 mg/dL (09 Mar 2020 21:46)    HOSPITAL MEDICATIONS:  MEDICATIONS  (STANDING):  amoxicillin  500 milliGRAM(s)/clavulanate 1 Tablet(s) Oral <User Schedule>  aspirin enteric coated 81 milliGRAM(s) Oral daily  chlorhexidine 4% Liquid 1 Application(s) Topical <User Schedule>  clopidogrel Tablet 75 milliGRAM(s) Oral daily  cyanocobalamin 1000 MICROGram(s) Oral daily  dextrose 50% Injectable 12.5 Gram(s) IV Push once  dextrose 50% Injectable 25 Gram(s) IV Push once  ergocalciferol 36357 Unit(s) Oral <User Schedule>  folic acid 1 milliGRAM(s) Oral daily  gabapentin 300 milliGRAM(s) Oral daily  heparin  Injectable 5000 Unit(s) SubCutaneous every 8 hours  insulin glargine Injectable (LANTUS) 8 Unit(s) SubCutaneous at bedtime  insulin lispro (HumaLOG) corrective regimen sliding scale   SubCutaneous three times a day before meals  insulin lispro (HumaLOG) corrective regimen sliding scale   SubCutaneous at bedtime  insulin lispro Injectable (HumaLOG) 2 Unit(s) SubCutaneous three times a day before meals  pantoprazole    Tablet 40 milliGRAM(s) Oral before breakfast    MEDICATIONS  (PRN):      LABS:                    MICROBIOLOGY:     RADIOLOGY:  [ ] Reviewed and interpreted by me    PULMONARY FUNCTION TESTS:    EKG: CHIEF COMPLAINT: Patient is a 64y old  Male who presents with a chief complaint of Unresponsiveness (05 Mar 2020 11:31)    Interval Events: none overnight       REVIEW OF SYSTEMS:  Constitutional: no complaints   CV: denies   Resp: denies   GI: denies   [x] All other systems negative  [ ] Unable to assess ROS because ________      OBJECTIVE:  ICU Vital Signs Last 24 Hrs  T(C): 36.8 (10 Mar 2020 05:00), Max: 37 (09 Mar 2020 21:00)  T(F): 98.2 (10 Mar 2020 05:00), Max: 98.6 (09 Mar 2020 21:00)  HR: 81 (10 Mar 2020 05:00) (81 - 89)  BP: 148/52 (10 Mar 2020 05:00) (114/61 - 148/52)  BP(mean): --  ABP: --  ABP(mean): --  RR: 20 (10 Mar 2020 05:00) (18 - 20)  SpO2: 90% (10 Mar 2020 05:00) (90% - 97%)    POCT Blood Glucose.: 236 mg/dL (09 Mar 2020 21:46)    HOSPITAL MEDICATIONS:  MEDICATIONS  (STANDING):  amoxicillin  500 milliGRAM(s)/clavulanate 1 Tablet(s) Oral <User Schedule>  aspirin enteric coated 81 milliGRAM(s) Oral daily  chlorhexidine 4% Liquid 1 Application(s) Topical <User Schedule>  clopidogrel Tablet 75 milliGRAM(s) Oral daily  cyanocobalamin 1000 MICROGram(s) Oral daily  dextrose 50% Injectable 12.5 Gram(s) IV Push once  dextrose 50% Injectable 25 Gram(s) IV Push once  ergocalciferol 29948 Unit(s) Oral <User Schedule>  folic acid 1 milliGRAM(s) Oral daily  gabapentin 300 milliGRAM(s) Oral daily  heparin  Injectable 5000 Unit(s) SubCutaneous every 8 hours  insulin glargine Injectable (LANTUS) 8 Unit(s) SubCutaneous at bedtime  insulin lispro (HumaLOG) corrective regimen sliding scale   SubCutaneous three times a day before meals  insulin lispro (HumaLOG) corrective regimen sliding scale   SubCutaneous at bedtime  insulin lispro Injectable (HumaLOG) 2 Unit(s) SubCutaneous three times a day before meals  pantoprazole    Tablet 40 milliGRAM(s) Oral before breakfast

## 2020-03-10 NOTE — PROGRESS NOTE ADULT - ATTENDING COMMENTS
Patient examined and care reviewed in detail on bedside rounds  Critically ill on vent/HD with unexplained coma For SBT today  Frequent bedside visits with therapy change today.   I have personally provided 35+ minutes of critical care time concurrently with the resident/fellow; this excludes time spent on separate procedures.
64M PMH CVA, DM2, ESRD on HD here after having acute hypercapnic/hypoxic respiratory failure after AVF outpatient procedure, intubated, found to have Influenza AH1 + Hemophilus influenzae pneumonia. Extubated 3/4. Doing well. Now on room air with SpO2>90%. Will need 4 more days of antibiotic therapy, can complete as outpatient. Next hemodialysis is tomorrow. Start d/c planning back to nursing home.
H.flu + Flu A, respiratory failure, now extubated on NC  S/p tamiflu and still on zosyn  Wean FIO2 as tolerated   Encephalopathy, will monitor MS
64 M with CVA, DM2, ESRD on HD here after having acute hypercapnic/hypoxic respiratory failure after AVF outpatient procedure, intubated, found to have flu, extubated.  Much improved, c/w abx for h flu.  plan for HD today. wean o2 as tolerated.
[facility]  Pager: [phone]  Cell: [phone]
[facility]  Pager: [phone]  Cell: [phone]
Dr Odilon Crockett  Nephrology  Office [phone]  Ans Serv [phone]  [facility] [phone]
Odilon Crockett MD  [facility]  Office [phone]  Ans Serv [phone] cell [phone]
[facility]  Pager: [phone]  Cell: [phone]
Patient examined and care reviewed in detail on bedside rounds  Critically ill with con'd unexplained MS change with inadequate airway clearance  Frequent bedside visits with therapy change today.   I have personally provided 35+ minutes of critical care time concurrently with the resident/fellow; this excludes time spent on separate procedures.
64 M with CVA, DM2, ESRD on HD here after having acute hypercapnic/hypoxic respiratory failure after AVF outpatient procedure, intubated, found to have flu, extubated.  s/p HD yesterday, weaning down oxygen support.
64M PMH CVA, DM2, ESRD on HD here after having acute hypercapnic/hypoxic respiratory failure after AVF outpatient procedure, intubated, found to have Influenza AH1 + Hemophilus influenzae pneumonia. Extubated 3/4. Doing well. Now on room air with SpO2>90%. Complete course of Augmentin. Hemodialysis today, then discharge to nursing home.

## 2020-08-22 NOTE — DISCHARGE NOTE NURSING/CASE MANAGEMENT/SOCIAL WORK - NSPROEXTENSIONSOFSELF_GEN_A_NUR
Spoke with Dr Rain lozano: on pm assessment of wound writer found wound to be dehisced in two areas measuring 1-2cm in width. ORders received to cover with gauze, abdpad and abdominal binder -- 4panel. MD to come in am.    none

## 2021-06-24 NOTE — ED PROCEDURE NOTE - CPROC ED TOLERANCE1
06/24/21 1558 Anjali Bryson
PATIENT HAS NO C/O PAIN OR NAUSEA. TOLERATING PO FLUIDS.  C/O FEELING
SLEEPY.  VSS.  REQUESTED PHONE FROM BELONGINGS.  WILL CONTINUE TO
MONITOR. Patient tolerated procedure well.

## 2021-12-10 NOTE — DISCHARGE NOTE NURSING/CASE MANAGEMENT/SOCIAL WORK - NSDCPEPAMP_GEN_ALL_CORE
“[facility]” pamphlet given Cimzia Counseling:  I discussed with the patient the risks of Cimzia including but not limited to immunosuppression, allergic reactions and infections.  The patient understands that monitoring is required including a PPD at baseline and must alert us or the primary physician if symptoms of infection or other concerning signs are noted.

## 2024-05-16 NOTE — ED PROVIDER NOTE - OBJECTIVE STATEMENT
[FreeTextEntry1] : I met with the pt and her son. She is recuperating well and we discussed her path, a copy was provided to her, She also asked re her hosp chart, and I advised her to contact HIM. We disc my advise for a med onc and rad onc eval to red r/o recurrence. Provided cards for VJ and BB, noting she may wish to see her primary med onc. We also disc the MMR results and its interpretation. Her instructions were discussed. All Q/A. She'll RTO in 3m, sooner prn. 
65yo M pmhx HTN DM ESRD on dialysis seizure disorder brought in from outpatient vascular office, was having a procedure for AV fistula, received 50 mcg fentanyl, 2mg versed, became unresponsive and bradypneic. Received Narcan, flumazenil, and dextrose IM with no response.